# Patient Record
Sex: MALE | Race: WHITE | NOT HISPANIC OR LATINO | Employment: OTHER | URBAN - METROPOLITAN AREA
[De-identification: names, ages, dates, MRNs, and addresses within clinical notes are randomized per-mention and may not be internally consistent; named-entity substitution may affect disease eponyms.]

---

## 2024-02-11 ENCOUNTER — HOSPITAL ENCOUNTER (INPATIENT)
Facility: HOSPITAL | Age: 89
LOS: 1 days | Discharge: HOME | DRG: 093 | End: 2024-02-12
Attending: STUDENT IN AN ORGANIZED HEALTH CARE EDUCATION/TRAINING PROGRAM | Admitting: HOSPITALIST
Payer: MEDICARE

## 2024-02-11 DIAGNOSIS — R29.818 ACUTE FOCAL NEUROLOGICAL DEFICIT: ICD-10-CM

## 2024-02-11 DIAGNOSIS — I63.9 CVA (CEREBRAL VASCULAR ACCIDENT): ICD-10-CM

## 2024-02-11 PROBLEM — I48.0 PAROXYSMAL A-FIB: Status: ACTIVE | Noted: 2024-02-11

## 2024-02-11 PROBLEM — I10 HTN (HYPERTENSION): Status: ACTIVE | Noted: 2024-02-11

## 2024-02-11 PROBLEM — R47.01 APHASIA: Status: ACTIVE | Noted: 2024-02-11

## 2024-02-11 PROBLEM — N18.30 CKD (CHRONIC KIDNEY DISEASE) STAGE 3, GFR 30-59 ML/MIN: Status: ACTIVE | Noted: 2024-02-11

## 2024-02-11 LAB
ALBUMIN SERPL BCP-MCNC: 3.8 G/DL (ref 3.5–5.2)
ALLENS TEST: ABNORMAL
ALP SERPL-CCNC: 54 U/L (ref 55–135)
ALT SERPL W/O P-5'-P-CCNC: 12 U/L (ref 10–44)
ANION GAP SERPL CALC-SCNC: 10 MMOL/L (ref 8–16)
ANION GAP SERPL CALC-SCNC: 17 MMOL/L (ref 8–16)
AST SERPL-CCNC: 14 U/L (ref 10–40)
BASOPHILS # BLD AUTO: 0.04 K/UL (ref 0–0.2)
BASOPHILS NFR BLD: 0.6 % (ref 0–1.9)
BILIRUB SERPL-MCNC: 0.8 MG/DL (ref 0.1–1)
BUN SERPL-MCNC: 37 MG/DL (ref 6–30)
BUN SERPL-MCNC: 37 MG/DL (ref 8–23)
CALCIUM SERPL-MCNC: 9.5 MG/DL (ref 8.7–10.5)
CHLORIDE SERPL-SCNC: 104 MMOL/L (ref 95–110)
CHLORIDE SERPL-SCNC: 108 MMOL/L (ref 95–110)
CHOLEST SERPL-MCNC: 145 MG/DL (ref 120–199)
CHOLEST/HDLC SERPL: 3.5 {RATIO} (ref 2–5)
CO2 SERPL-SCNC: 22 MMOL/L (ref 23–29)
CREAT SERPL-MCNC: 1.6 MG/DL (ref 0.5–1.4)
CREAT SERPL-MCNC: 1.7 MG/DL (ref 0.5–1.4)
DIFFERENTIAL METHOD BLD: ABNORMAL
EOSINOPHIL # BLD AUTO: 0.1 K/UL (ref 0–0.5)
EOSINOPHIL NFR BLD: 1.4 % (ref 0–8)
ERYTHROCYTE [DISTWIDTH] IN BLOOD BY AUTOMATED COUNT: 14 % (ref 11.5–14.5)
EST. GFR  (NO RACE VARIABLE): 42 ML/MIN/1.73 M^2
GLUCOSE SERPL-MCNC: 121 MG/DL (ref 70–110)
GLUCOSE SERPL-MCNC: 121 MG/DL (ref 70–110)
HCT VFR BLD AUTO: 42.8 % (ref 40–54)
HCT VFR BLD CALC: 40 %PCV (ref 36–54)
HDLC SERPL-MCNC: 42 MG/DL (ref 40–75)
HDLC SERPL: 29 % (ref 20–50)
HGB BLD-MCNC: 13.2 G/DL (ref 14–18)
IMM GRANULOCYTES # BLD AUTO: 0.03 K/UL (ref 0–0.04)
IMM GRANULOCYTES NFR BLD AUTO: 0.4 % (ref 0–0.5)
INR PPP: 1 (ref 0.8–1.2)
LDLC SERPL CALC-MCNC: 75.6 MG/DL (ref 63–159)
LYMPHOCYTES # BLD AUTO: 1.5 K/UL (ref 1–4.8)
LYMPHOCYTES NFR BLD: 20.7 % (ref 18–48)
MCH RBC QN AUTO: 28.4 PG (ref 27–31)
MCHC RBC AUTO-ENTMCNC: 30.8 G/DL (ref 32–36)
MCV RBC AUTO: 92 FL (ref 82–98)
MONOCYTES # BLD AUTO: 0.9 K/UL (ref 0.3–1)
MONOCYTES NFR BLD: 12.6 % (ref 4–15)
NEUTROPHILS # BLD AUTO: 4.7 K/UL (ref 1.8–7.7)
NEUTROPHILS NFR BLD: 64.3 % (ref 38–73)
NONHDLC SERPL-MCNC: 103 MG/DL
NRBC BLD-RTO: 0 /100 WBC
PLATELET # BLD AUTO: 148 K/UL (ref 150–450)
PMV BLD AUTO: 10.9 FL (ref 9.2–12.9)
POC IONIZED CALCIUM: 1.26 MMOL/L (ref 1.06–1.42)
POC TCO2 (MEASURED): 25 MMOL/L (ref 23–29)
POCT GLUCOSE: 121 MG/DL (ref 70–110)
POTASSIUM BLD-SCNC: 4.5 MMOL/L (ref 3.5–5.1)
POTASSIUM SERPL-SCNC: 4.6 MMOL/L (ref 3.5–5.1)
PROT SERPL-MCNC: 7.1 G/DL (ref 6–8.4)
PROTHROMBIN TIME: 11.4 SEC (ref 9–12.5)
RBC # BLD AUTO: 4.64 M/UL (ref 4.6–6.2)
SAMPLE: ABNORMAL
SITE: ABNORMAL
SODIUM BLD-SCNC: 141 MMOL/L (ref 136–145)
SODIUM SERPL-SCNC: 140 MMOL/L (ref 136–145)
TRIGL SERPL-MCNC: 137 MG/DL (ref 30–150)
TSH SERPL DL<=0.005 MIU/L-ACNC: 2.77 UIU/ML (ref 0.4–4)
WBC # BLD AUTO: 7.24 K/UL (ref 3.9–12.7)

## 2024-02-11 PROCEDURE — 25500020 PHARM REV CODE 255: Performed by: STUDENT IN AN ORGANIZED HEALTH CARE EDUCATION/TRAINING PROGRAM

## 2024-02-11 PROCEDURE — 84443 ASSAY THYROID STIM HORMONE: CPT | Performed by: STUDENT IN AN ORGANIZED HEALTH CARE EDUCATION/TRAINING PROGRAM

## 2024-02-11 PROCEDURE — 80061 LIPID PANEL: CPT | Performed by: STUDENT IN AN ORGANIZED HEALTH CARE EDUCATION/TRAINING PROGRAM

## 2024-02-11 PROCEDURE — G0378 HOSPITAL OBSERVATION PER HR: HCPCS

## 2024-02-11 PROCEDURE — 82565 ASSAY OF CREATININE: CPT

## 2024-02-11 PROCEDURE — 93005 ELECTROCARDIOGRAM TRACING: CPT

## 2024-02-11 PROCEDURE — 84295 ASSAY OF SERUM SODIUM: CPT

## 2024-02-11 PROCEDURE — 84132 ASSAY OF SERUM POTASSIUM: CPT

## 2024-02-11 PROCEDURE — 85610 PROTHROMBIN TIME: CPT | Performed by: STUDENT IN AN ORGANIZED HEALTH CARE EDUCATION/TRAINING PROGRAM

## 2024-02-11 PROCEDURE — 93010 ELECTROCARDIOGRAM REPORT: CPT | Mod: ,,, | Performed by: INTERNAL MEDICINE

## 2024-02-11 PROCEDURE — 99900035 HC TECH TIME PER 15 MIN (STAT)

## 2024-02-11 PROCEDURE — 85025 COMPLETE CBC W/AUTO DIFF WBC: CPT | Performed by: STUDENT IN AN ORGANIZED HEALTH CARE EDUCATION/TRAINING PROGRAM

## 2024-02-11 PROCEDURE — 99285 EMERGENCY DEPT VISIT HI MDM: CPT | Mod: 25

## 2024-02-11 PROCEDURE — 83036 HEMOGLOBIN GLYCOSYLATED A1C: CPT | Performed by: PHYSICIAN ASSISTANT

## 2024-02-11 PROCEDURE — 85014 HEMATOCRIT: CPT

## 2024-02-11 PROCEDURE — 80053 COMPREHEN METABOLIC PANEL: CPT | Performed by: STUDENT IN AN ORGANIZED HEALTH CARE EDUCATION/TRAINING PROGRAM

## 2024-02-11 PROCEDURE — 82330 ASSAY OF CALCIUM: CPT

## 2024-02-11 RX ORDER — AMLODIPINE BESYLATE 5 MG/1
2.5 TABLET ORAL 2 TIMES DAILY
COMMUNITY

## 2024-02-11 RX ORDER — ATORVASTATIN CALCIUM 10 MG/1
40 TABLET, FILM COATED ORAL DAILY
Status: DISCONTINUED | OUTPATIENT
Start: 2024-02-12 | End: 2024-02-12 | Stop reason: HOSPADM

## 2024-02-11 RX ORDER — ERGOCALCIFEROL 1.25 MG/1
50000 CAPSULE ORAL
COMMUNITY
End: 2024-02-12

## 2024-02-11 RX ORDER — AMOXICILLIN 500 MG
CAPSULE ORAL DAILY
COMMUNITY

## 2024-02-11 RX ORDER — ROSUVASTATIN CALCIUM 20 MG/1
20 TABLET, COATED ORAL DAILY
COMMUNITY
End: 2024-02-12 | Stop reason: HOSPADM

## 2024-02-11 RX ORDER — LOSARTAN POTASSIUM 25 MG/1
25 TABLET ORAL DAILY
COMMUNITY

## 2024-02-11 RX ORDER — TALC
6 POWDER (GRAM) TOPICAL NIGHTLY PRN
Status: DISCONTINUED | OUTPATIENT
Start: 2024-02-11 | End: 2024-02-12 | Stop reason: HOSPADM

## 2024-02-11 RX ORDER — BISACODYL 10 MG/1
10 SUPPOSITORY RECTAL DAILY PRN
Status: DISCONTINUED | OUTPATIENT
Start: 2024-02-11 | End: 2024-02-12 | Stop reason: SDUPTHER

## 2024-02-11 RX ORDER — METOPROLOL TARTRATE 50 MG/1
50 TABLET ORAL 2 TIMES DAILY
COMMUNITY
End: 2024-02-12 | Stop reason: DRUGHIGH

## 2024-02-11 RX ORDER — ACETAMINOPHEN 325 MG/1
650 TABLET ORAL EVERY 6 HOURS PRN
Status: DISCONTINUED | OUTPATIENT
Start: 2024-02-11 | End: 2024-02-12 | Stop reason: HOSPADM

## 2024-02-11 RX ORDER — SODIUM CHLORIDE 0.9 % (FLUSH) 0.9 %
10 SYRINGE (ML) INJECTION
Status: DISCONTINUED | OUTPATIENT
Start: 2024-02-11 | End: 2024-02-12 | Stop reason: HOSPADM

## 2024-02-11 RX ORDER — AMOXICILLIN 250 MG
1 CAPSULE ORAL DAILY PRN
Status: DISCONTINUED | OUTPATIENT
Start: 2024-02-11 | End: 2024-02-12 | Stop reason: HOSPADM

## 2024-02-11 RX ADMIN — IOHEXOL 100 ML: 350 INJECTION, SOLUTION INTRAVENOUS at 04:02

## 2024-02-11 NOTE — Clinical Note
Diagnosis: Acute focal neurological deficit [[id number]]   Future Attending Provider: JASVIR GOODE [[id number]]

## 2024-02-11 NOTE — ED TRIAGE NOTES
Pt bib via pov for having trouble speaking starting 2 hours pta. Speech is not slurred or dysarthric at this time but pt is having difficulty articulating certain words. Denies headache, vision problems, weakness. Pt walks with steady gait.

## 2024-02-11 NOTE — ED PROVIDER NOTES
Encounter Date: 2/11/2024    SCRIBE #1 NOTE: I, Bruce Chavira Do, am scribing for, and in the presence of,  Jeny Seo DO. I have scribed the following portions of the note - Other sections scribed: HPI, ROS, PE.       History     Chief Complaint   Patient presents with    Aphasia     Pt to ER with reports of expressive aphasia x 2 hours. Pt denies all other symptoms. Dr campoverde in triage. Code stroke called. Pt to CT      Honorio Myers is a 88 y.o. male, with a past medical history of A-fib, CKD stage 3a, HLD, HTN, T2DM, who presents to the ED with expressive aphasia onset 2 hours ago. Additional history provided by independent historian, spouse, notes the patient did not appear baseline. She endorses he was having speech difficulty inside the kitchen. She reports the patient tried writing a message, but notes writing was incomprehensible. Patient reports knowing what to say, but endorses difficulty expressing words. Patient is 205/82 mmHg at triage. He notes compliance with Apixaban. No other exacerbating or alleviating factors. Patient denies slurred speech, facial droop, light-headedness, dizziness, chest pain, SOB, numbness, weakness, or other associated symptoms.  Denies alcohol tobacco or drug use.      The history is provided by the patient and the spouse. No  was used.     Review of patient's allergies indicates:   Allergen Reactions    Ace inhibitors      Past Medical History:   Diagnosis Date    CKD (chronic kidney disease) stage 3, GFR 30-59 ml/min     Hypertension     Paroxysmal A-fib      Past Surgical History:   Procedure Laterality Date    EYE SURGERY       History reviewed. No pertinent family history.  Social History     Tobacco Use    Smoking status: Never    Smokeless tobacco: Never   Substance Use Topics    Alcohol use: Yes    Drug use: Never     Review of Systems   Constitutional:  Negative for chills and fever.   HENT:  Negative for drooling and voice  change.    Eyes:  Negative for photophobia and visual disturbance.   Respiratory:  Negative for shortness of breath and wheezing.    Cardiovascular:  Negative for chest pain and leg swelling.   Gastrointestinal:  Negative for abdominal pain, diarrhea, nausea and vomiting.   Genitourinary:  Negative for dysuria, frequency, hematuria and urgency.   Musculoskeletal:  Negative for back pain, myalgias and neck pain.   Skin:  Negative for rash and wound.   Neurological:  Positive for speech difficulty. Negative for dizziness, syncope, facial asymmetry, weakness, light-headedness and numbness.   Psychiatric/Behavioral:  Negative for agitation, confusion and suicidal ideas.    All other systems reviewed and are negative.      Physical Exam     Initial Vitals [02/11/24 1602]   BP Pulse Resp Temp SpO2   (!) 205/82 84 20 97.7 °F (36.5 °C) 100 %      MAP       --         Physical Exam    Nursing note and vitals reviewed.  Constitutional: He appears well-developed and well-nourished. He is not diaphoretic.   HENT:   Head: Normocephalic and atraumatic.   Right Ear: External ear normal.   Left Ear: External ear normal.   Mouth/Throat: Oropharynx is clear and moist. No oropharyngeal exudate.   Eyes: Conjunctivae and EOM are normal. Pupils are equal, round, and reactive to light. Right eye exhibits no discharge. Left eye exhibits no discharge.   Neck: Neck supple. No JVD present.   Normal range of motion.  Cardiovascular:  Normal rate, regular rhythm, normal heart sounds and intact distal pulses.     Exam reveals no gallop and no friction rub.       No murmur heard.  Hypertensive.   Pulmonary/Chest: Breath sounds normal. No respiratory distress. He has no wheezes. He has no rhonchi. He has no rales.   Abdominal: Abdomen is soft. Bowel sounds are normal. He exhibits no distension. There is no abdominal tenderness. There is no rebound and no guarding.   Musculoskeletal:         General: No tenderness or edema. Normal range of motion.       Cervical back: Normal range of motion and neck supple.     Lymphadenopathy:     He has no cervical adenopathy.   Neurological: He is alert and oriented to person, place, and time. He has normal strength. He displays no atrophy, no tremor and normal reflexes. No cranial nerve deficit or sensory deficit. He exhibits normal muscle tone. He displays no seizure activity. Coordination and gait normal. GCS score is 15. GCS eye subscore is 4. GCS verbal subscore is 5. GCS motor subscore is 6.   Moves all extremities and carries on conversation. CN- II: PERRL; III/IV/VI: EOMI w/out evidence of nystagmus; V: no deficits appreciated to light touch bilateral face; VII: no facial weakness, no facial asymmetry. Eyebrow raise symmetric. Smile symmetric; IX/X: palate midline, and raises symmetrically; XI: shoulder shrug 5/5 bilaterally; XII: tongue is midline w/out asymmetry. Strength 5/5 to bilateral upper and lower extremities, sensation intact to light touch,        Skin: Skin is warm and dry.   Psychiatric: He has a normal mood and affect. Thought content normal.         ED Course   Critical Care    Date/Time: 2/11/2024 6:42 PM    Performed by: Jeny Seo DO  Authorized by: Jeny Seo DO  Direct patient critical care time: 35 minutes  Additional history critical care time: 10 minutes  Ordering / reviewing critical care time: 10 minutes  Documentation critical care time: 10 minutes  Consulting other physicians critical care time: 5 minutes  Consult with family critical care time: 10 minutes  Total critical care time (exclusive of procedural time) : 80 minutes  Critical care time was exclusive of separately billable procedures and treating other patients and teaching time.  Critical care was necessary to treat or prevent imminent or life-threatening deterioration of the following conditions: CNS failure or compromise.  Critical care was time spent personally by me on the following activities:  development of treatment plan with patient or surrogate, discussions with consultants, interpretation of cardiac output measurements, evaluation of patient's response to treatment, examination of patient, obtaining history from patient or surrogate, ordering and performing treatments and interventions, ordering and review of laboratory studies, ordering and review of radiographic studies, pulse oximetry, re-evaluation of patient's condition and review of old charts.        Labs Reviewed   CBC W/ AUTO DIFFERENTIAL - Abnormal; Notable for the following components:       Result Value    Hemoglobin 13.2 (*)     MCHC 30.8 (*)     Platelets 148 (*)     All other components within normal limits   COMPREHENSIVE METABOLIC PANEL - Abnormal; Notable for the following components:    CO2 22 (*)     Glucose 121 (*)     BUN 37 (*)     Creatinine 1.6 (*)     Alkaline Phosphatase 54 (*)     eGFR 42 (*)     All other components within normal limits   HEMOGLOBIN A1C - Abnormal; Notable for the following components:    Hemoglobin A1C 6.0 (*)     All other components within normal limits   COMPREHENSIVE METABOLIC PANEL - Abnormal; Notable for the following components:    Glucose 133 (*)     BUN 29 (*)     Albumin 3.4 (*)     Alkaline Phosphatase 48 (*)     eGFR 59 (*)     All other components within normal limits   CBC W/ AUTO DIFFERENTIAL - Abnormal; Notable for the following components:    RBC 4.27 (*)     Hemoglobin 12.0 (*)     Hematocrit 38.1 (*)     MCHC 31.5 (*)     Platelets 131 (*)     Lymph % 16.6 (*)     All other components within normal limits   TROPONIN I - Abnormal; Notable for the following components:    Troponin I 0.028 (*)     All other components within normal limits   POCT GLUCOSE - Abnormal; Notable for the following components:    POCT Glucose 121 (*)     All other components within normal limits   ISTAT PROCEDURE - Abnormal; Notable for the following components:    POC Glucose 121 (*)     POC BUN 37 (*)     POC  Creatinine 1.7 (*)     POC Anion Gap 17 (*)     All other components within normal limits   PROTIME-INR   TSH   LIPID PANEL   MAGNESIUM   PHOSPHORUS   APTT   PROTIME-INR   POCT PROTIME-INR     EKG Readings: (Independently Interpreted)   Normal sinus rhythm with a rate of 70 beats per minute, normal axis and intervals, nonspecific T-wave inversions in leads 3, AVF and V1.  No obvious acute ST segment changes.  EKG grossly unchanged when compared to previous EKG from April 10, 2010     ECG Results              EKG 12-LEAD (Final result)  Result time 02/12/24 12:07:32      Final result by Unknown User (02/12/24 12:07:32)                                      Imaging Results              MRI Brain Without Contrast (Final result)  Result time 02/12/24 11:49:07      Final result by Pancho Cruz III, MD (02/12/24 11:49:07)                   Impression:      Two punctate areas of recent microvascular small vessel ischemic change involving the left subinsular cortex and the left parietal lobe.      Electronically signed by: Pancho Cruz MD  Date:    02/12/2024  Time:    11:49               Narrative:    EXAMINATION:  MRI BRAIN WITHOUT CONTRAST    CLINICAL HISTORY:  Stroke, follow up;    FINDINGS:  There is a small diffusion positive focus involving the left subinsular cortex measuring a few mm.  There is a small diffusion positive focus involving the left parietal cortex measuring a few mm.  Remaining brain is diffusion negative.  Pituitary, midline structures, and craniocervical junction demonstrate nothing unusual.  GRE images demonstrate no significant blood products.  Flow voids are present were expected.  There are cataract implants.  There is mild left mastoid air cell opacification.  Skull and the skull base demonstrate nothing unusual.  Hippocampal formations are symmetric.  FLAIR images demonstrate involutional change but no significant chronic microvascular small vessel ischemic change.                                        CTA Head and Neck (xpd) (Edited Result - FINAL)  Result time 02/11/24 17:04:03      Addendum (preliminary) 1 of 1 by Urban Eugene MD (02/11/24 17:04:03)      No enhancing intracranial lesion is identified.      Electronically signed by: Urban Eugene  Date:    02/11/2024  Time:    17:04                 Final result by Urban Eugene MD (02/11/24 16:57:17)                   Impression:      No significant stenosis at the carotid bifurcations by NASCET criteria.    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  Calcification with moderate approaching severe stenosis at the origin of the left vertebral artery.  No significant stenosis of the right vertebral artery.    No major branch stenosis/occlusion at the [id number].    Disc bulging/endplate osteophyte formation with facet ligamentous hypertrophy flattens/impinges the traversing cord at C3-4 through C5-6.      Electronically signed by: Urban Eugene  Date:    02/11/2024  Time:    16:57               Narrative:    EXAMINATION:  CTA HEAD AND NECK (XPD)    CLINICAL HISTORY:  Neuro deficit, acute, stroke suspected;    TECHNIQUE:  CT angiogram was performed from the level of the laura to the top of the head following the IV administration of 100mL of Omnipaque 350.   Sagittal and coronal reconstructions and maximum intensity projection reconstructions were performed. Arterial stenosis percentages are based on NASCET measurement criteria.    COMPARISON:  None    FINDINGS:  There is prominent calcification with severe stenosis at the origin of the left subclavian artery extending to the proximal subclavian artery.  Calcification with moderate approaching severe stenosis is noted at the origin of the left vertebral artery without significant stenosis on the right.  The right is mildly dominant.    Mild calcification without significant stenosis by NASCET criteria at the carotid bifurcations  bilaterally.    Intracranially no major branch stenosis/occlusion at the bu[place].  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    Junctional widening of the basilar tip but no evidence of aneurysm.  The venous sinuses are patent.    No soft tissue mass is identified in the neck.  Disc bulging and endplate osteophyte formation with facet and ligamentous hypertrophy at C3-4 through C5-6 flattens/impinges the traversing cord.    These findings were communicated to Dr. Seo at 16:46 on 02/11/2024.                                        CT Head Without Contrast (Final result)  Result time 02/11/24 16:31:17      Final result by John Figueroa MD (02/11/24 16:31:17)                   Impression:      1. No acute intracranial process.  2. 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  3. 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  Limited characterization.  CTA may better characterize.  4. This report was flagged in Epic as abnormal.      Electronically signed by: John Figueroa  Date:    02/11/2024  Time:    16:31               Narrative:    EXAMINATION:  CT HEAD WITHOUT CONTRAST    CLINICAL HISTORY:  Neuro deficit, acute, stroke suspected;    TECHNIQUE:  Low dose axial CT images obtained throughout the head without intravenous contrast. Sagittal and coronal reconstructions were performed.    COMPARISON:  None.    FINDINGS:  Intracranial compartment:    Ventricles and sulci are normal in size for age without evidence of hydrocephalus. No extra-axial blood or fluid collections.    Moderate involutional changes.  Mild probable chronic microvascular ischemic changes in the periventricular white matter.  Small remote lacunar infarct of the right caudate body.    No parenchymal mass, hemorrhage, edema or major vascular distribution infarct.    Skull/extracranial contents (limited  evaluation): No fracture. Mastoid air cells and paranasal sinuses are essentially clear.    There is junctional dilation or possible aneurysm at the basilar tip.  Limited characterization.  CTA may better characterize.  No evidence of rupture.                                       Medications   iohexoL (OMNIPAQUE 350) injection 100 mL (100 mLs Intravenous Given 2/11/24 1624)     Medical Decision Making   MDM  This is an emergent evaluation of a 88 y.o. with A-fib, CKD stage 3a, HLD, HTN, T2DM, who presents to the ED with expressive aphasia onset 2 hours ago. Initial vitals in the ED [02/11/24 1602]  BP: (!) 205/82  Pulse: 84  Resp: 20  Temp: 97.7 °F (36.5 °C)  SpO2: 100 % .     Physical exam noted above. DDx includes but is not limited to ischemic versus hemorrhagic stroke, intracranial hemorrhage versus other acute intracranial abnormality, electrolyte abnormality. Also considered but clinically less likely to be dissection, PE, ACS, meningitis. Will obtain labs and imaging including stroke protocol workup including CBC, CMP, lipid panel, TSH, coags, EKG in CT head.  Stroke code was called while in triage.  Consult placed to vascular Neurology. Will continue to monitor and frequently reassess pending results of labs, treatments and final disposition.    Patient is aware of plan and is amenable.     Jeny Seo D.O  EMERGENCY MEDICINE  4:18 PM 02/11/2024    UPDATE:  Labs reveal mildly elevated creatinine at 1.6 which is increased from normal limits from labs from 2011.  BUN also 37.  Remainder of labs unremarkable.  CT head showed no acute intracranial abnormality.  Case was initially discussed with vascular Neurology, , who recommended that patient not a TNK candidate given anticoagulation use.  Recommended obtaining a stat CTA of the head and neck to rule out LVO and admission for further stroke workup if CTA unremarkable.  CTA of the head and neck reveals extensive calcification with severe  stenosis at the origin and proximal left subclavian artery as well as calcification with moderate approaches severe stenosis of the origin of the left vertebral artery.  No significant stenosis of the right vertebral artery.  I discussed these findings with vascular Neurology, who again recommended that patient not meeting indications for acute intervention and should be admitted to Hospital Medicine for further stroke workup.  Discussed case with Hospital Medicine and patient admitted to the hospital medicine observation service.  Patient and his wife aware and agreeable to plan.  This chart was completed using dictation software, as a result there may be some transcription errors.    Amount and/or Complexity of Data Reviewed  Independent Historian: spouse     Details: See HPI.  External Data Reviewed: labs, radiology, ECG and notes.  Labs: ordered. Decision-making details documented in ED Course.  Radiology: ordered and independent interpretation performed. Decision-making details documented in ED Course.  ECG/medicine tests: ordered and independent interpretation performed. Decision-making details documented in ED Course.    Risk  OTC drugs.  Prescription drug management.  Decision regarding hospitalization.      Additional MDM:     NIH Stroke Scale:   Interval = baseline (upon arrival/admit)  Level of consciousness = 0 - alert  LOC questions = 0 - answers both correctly  Best gaze = 0 - normal  Visual = 0 - no visual loss  Facial palsy = 0 - normal  Motor left arm =  0 - no drift  Motor right arm =  0 - no drift  Motor left leg = 0 - no drift  Motor right leg =  0 - no drift  Limb ataxia = 0 - absent  Sensory = 0 - normal  Dysarthria = 1 - mild to moderate dysarthria  Extinction and inattention = 0 - no neglect  NIH Stroke Scale Total = 1           Scribe Attestation:   Scribe #1: I performed the above scribed service and the documentation accurately describes the services I performed. I attest to the accuracy  of the note.              I, Jeny Seo DO, personally performed the services described in this documentation.  All medical record entries made by the scribe were at my direction and in my presence.  I have reviewed the chart and agree that the record reflects my personal performance and is accurate and complete.                   Clinical Impression:  Final diagnoses:  [R29.818] Acute focal neurological deficit  [I63.9] CVA (cerebral vascular accident)          ED Disposition Condition    Admit                 Jeny Seo DO  02/14/24 0543

## 2024-02-12 VITALS
WEIGHT: 175 LBS | HEIGHT: 69 IN | TEMPERATURE: 98 F | OXYGEN SATURATION: 97 % | DIASTOLIC BLOOD PRESSURE: 69 MMHG | SYSTOLIC BLOOD PRESSURE: 102 MMHG | HEART RATE: 79 BPM | BODY MASS INDEX: 25.92 KG/M2 | RESPIRATION RATE: 18 BRPM

## 2024-02-12 LAB
ALBUMIN SERPL BCP-MCNC: 3.4 G/DL (ref 3.5–5.2)
ALP SERPL-CCNC: 48 U/L (ref 55–135)
ALT SERPL W/O P-5'-P-CCNC: 11 U/L (ref 10–44)
ANION GAP SERPL CALC-SCNC: 8 MMOL/L (ref 8–16)
AORTIC ROOT ANNULUS: 3.22 CM
AORTIC VALVE CUSP SEPERATION: 2.51 CM
APTT PPP: 28.3 SEC (ref 21–32)
ASCENDING AORTA: 2.98 CM
AST SERPL-CCNC: 15 U/L (ref 10–40)
AV INDEX (PROSTH): 0.76
AV MEAN GRADIENT: 4 MMHG
AV PEAK GRADIENT: 8 MMHG
AV VALVE AREA BY VELOCITY RATIO: 2.85 CM²
AV VALVE AREA: 2.98 CM²
AV VELOCITY RATIO: 0.72
BASOPHILS # BLD AUTO: 0.03 K/UL (ref 0–0.2)
BASOPHILS NFR BLD: 0.4 % (ref 0–1.9)
BILIRUB SERPL-MCNC: 0.8 MG/DL (ref 0.1–1)
BSA FOR ECHO PROCEDURE: 1.97 M2
BUN SERPL-MCNC: 29 MG/DL (ref 8–23)
CALCIUM SERPL-MCNC: 9.3 MG/DL (ref 8.7–10.5)
CHLORIDE SERPL-SCNC: 107 MMOL/L (ref 95–110)
CO2 SERPL-SCNC: 23 MMOL/L (ref 23–29)
CREAT SERPL-MCNC: 1.2 MG/DL (ref 0.5–1.4)
CV ECHO LV RWT: 0.41 CM
DIFFERENTIAL METHOD BLD: ABNORMAL
DOP CALC AO PEAK VEL: 1.45 M/S
DOP CALC AO VTI: 32.4 CM
DOP CALC LVOT AREA: 3.9 CM2
DOP CALC LVOT DIAMETER: 2.24 CM
DOP CALC LVOT PEAK VEL: 1.05 M/S
DOP CALC LVOT STROKE VOLUME: 96.5 CM3
DOP CALCLVOT PEAK VEL VTI: 24.5 CM
E WAVE DECELERATION TIME: 231.69 MSEC
E/A RATIO: 0.78
E/E' RATIO: 8.38 M/S
ECHO LV POSTERIOR WALL: 0.98 CM (ref 0.6–1.1)
EOSINOPHIL # BLD AUTO: 0.1 K/UL (ref 0–0.5)
EOSINOPHIL NFR BLD: 1.2 % (ref 0–8)
ERYTHROCYTE [DISTWIDTH] IN BLOOD BY AUTOMATED COUNT: 13.8 % (ref 11.5–14.5)
EST. GFR  (NO RACE VARIABLE): 59 ML/MIN/1.73 M^2
ESTIMATED AVG GLUCOSE: 126 MG/DL (ref 68–131)
FRACTIONAL SHORTENING: 31 % (ref 28–44)
GLUCOSE SERPL-MCNC: 133 MG/DL (ref 70–110)
HBA1C MFR BLD: 6 % (ref 4–5.6)
HCT VFR BLD AUTO: 38.1 % (ref 40–54)
HGB BLD-MCNC: 12 G/DL (ref 14–18)
IMM GRANULOCYTES # BLD AUTO: 0.02 K/UL (ref 0–0.04)
IMM GRANULOCYTES NFR BLD AUTO: 0.3 % (ref 0–0.5)
INR PPP: 1.1 (ref 0.8–1.2)
INTERVENTRICULAR SEPTUM: 1.03 CM (ref 0.6–1.1)
IVRT: 85.63 MSEC
LA MAJOR: 5 CM
LA MINOR: 5.04 CM
LA WIDTH: 3.8 CM
LEFT ATRIUM SIZE: 3.69 CM
LEFT ATRIUM VOLUME INDEX: 30.7 ML/M2
LEFT ATRIUM VOLUME: 59.83 CM3
LEFT INTERNAL DIMENSION IN SYSTOLE: 3.29 CM (ref 2.1–4)
LEFT VENTRICLE DIASTOLIC VOLUME INDEX: 53.58 ML/M2
LEFT VENTRICLE DIASTOLIC VOLUME: 104.49 ML
LEFT VENTRICLE MASS INDEX: 86 G/M2
LEFT VENTRICLE SYSTOLIC VOLUME INDEX: 22.5 ML/M2
LEFT VENTRICLE SYSTOLIC VOLUME: 43.9 ML
LEFT VENTRICULAR INTERNAL DIMENSION IN DIASTOLE: 4.74 CM (ref 3.5–6)
LEFT VENTRICULAR MASS: 167.87 G
LV LATERAL E/E' RATIO: 6.7 M/S
LV SEPTAL E/E' RATIO: 11.17 M/S
LVOT MG: 2.2 MMHG
LVOT MV: 0.69 CM/S
LYMPHOCYTES # BLD AUTO: 1.1 K/UL (ref 1–4.8)
LYMPHOCYTES NFR BLD: 16.6 % (ref 18–48)
MAGNESIUM SERPL-MCNC: 2.1 MG/DL (ref 1.6–2.6)
MCH RBC QN AUTO: 28.1 PG (ref 27–31)
MCHC RBC AUTO-ENTMCNC: 31.5 G/DL (ref 32–36)
MCV RBC AUTO: 89 FL (ref 82–98)
MONOCYTES # BLD AUTO: 0.9 K/UL (ref 0.3–1)
MONOCYTES NFR BLD: 12.9 % (ref 4–15)
MV PEAK A VEL: 0.86 M/S
MV PEAK E VEL: 0.67 M/S
MV STENOSIS PRESSURE HALF TIME: 67.19 MS
MV VALVE AREA P 1/2 METHOD: 3.27 CM2
NEUTROPHILS # BLD AUTO: 4.6 K/UL (ref 1.8–7.7)
NEUTROPHILS NFR BLD: 68.6 % (ref 38–73)
NRBC BLD-RTO: 0 /100 WBC
PHOSPHATE SERPL-MCNC: 3.8 MG/DL (ref 2.7–4.5)
PISA TR MAX VEL: 2.78 M/S
PLATELET # BLD AUTO: 131 K/UL (ref 150–450)
PMV BLD AUTO: 10.3 FL (ref 9.2–12.9)
POTASSIUM SERPL-SCNC: 4.1 MMOL/L (ref 3.5–5.1)
PROT SERPL-MCNC: 6.2 G/DL (ref 6–8.4)
PROTHROMBIN TIME: 11.7 SEC (ref 9–12.5)
PULM VEIN S/D RATIO: 1.55
PV PEAK D VEL: 0.29 M/S
PV PEAK GRADIENT: 7 MMHG
PV PEAK S VEL: 0.45 M/S
PV PEAK VELOCITY: 1.32 M/S
RA MAJOR: 5.02 CM
RA PRESSURE ESTIMATED: 3 MMHG
RA WIDTH: 2.7 CM
RBC # BLD AUTO: 4.27 M/UL (ref 4.6–6.2)
RIGHT VENTRICULAR END-DIASTOLIC DIMENSION: 3.78 CM
RV TB RVSP: 6 MMHG
RV TISSUE DOPPLER FREE WALL SYSTOLIC VELOCITY 1 (APICAL 4 CHAMBER VIEW): 14.48 CM/S
SINUS: 3.03 CM
SODIUM SERPL-SCNC: 138 MMOL/L (ref 136–145)
STJ: 2.48 CM
TDI LATERAL: 0.1 M/S
TDI SEPTAL: 0.06 M/S
TDI: 0.08 M/S
TR MAX PG: 31 MMHG
TRICUSPID ANNULAR PLANE SYSTOLIC EXCURSION: 2.05 CM
TROPONIN I SERPL DL<=0.01 NG/ML-MCNC: 0.03 NG/ML (ref 0–0.03)
TV PEAK GRADIENT: 2 MMHG
TV REST PULMONARY ARTERY PRESSURE: 34 MMHG
WBC # BLD AUTO: 6.75 K/UL (ref 3.9–12.7)
Z-SCORE OF LEFT VENTRICULAR DIMENSION IN END DIASTOLE: -1.6
Z-SCORE OF LEFT VENTRICULAR DIMENSION IN END SYSTOLE: -0.31

## 2024-02-12 PROCEDURE — 99223 1ST HOSP IP/OBS HIGH 75: CPT | Mod: ,,, | Performed by: INTERNAL MEDICINE

## 2024-02-12 PROCEDURE — 92610 EVALUATE SWALLOWING FUNCTION: CPT

## 2024-02-12 PROCEDURE — 99223 1ST HOSP IP/OBS HIGH 75: CPT | Mod: 25,,, | Performed by: INTERNAL MEDICINE

## 2024-02-12 PROCEDURE — 12000002 HC ACUTE/MED SURGE SEMI-PRIVATE ROOM

## 2024-02-12 PROCEDURE — 80053 COMPREHEN METABOLIC PANEL: CPT | Performed by: PHYSICIAN ASSISTANT

## 2024-02-12 PROCEDURE — 85025 COMPLETE CBC W/AUTO DIFF WBC: CPT | Performed by: PHYSICIAN ASSISTANT

## 2024-02-12 PROCEDURE — 25000003 PHARM REV CODE 250: Performed by: PHYSICIAN ASSISTANT

## 2024-02-12 PROCEDURE — 85730 THROMBOPLASTIN TIME PARTIAL: CPT | Performed by: PHYSICIAN ASSISTANT

## 2024-02-12 PROCEDURE — 92523 SPEECH SOUND LANG COMPREHEN: CPT

## 2024-02-12 PROCEDURE — 84100 ASSAY OF PHOSPHORUS: CPT | Performed by: PHYSICIAN ASSISTANT

## 2024-02-12 PROCEDURE — 84484 ASSAY OF TROPONIN QUANT: CPT | Performed by: PHYSICIAN ASSISTANT

## 2024-02-12 PROCEDURE — 83735 ASSAY OF MAGNESIUM: CPT | Performed by: PHYSICIAN ASSISTANT

## 2024-02-12 PROCEDURE — 85610 PROTHROMBIN TIME: CPT | Performed by: PHYSICIAN ASSISTANT

## 2024-02-12 RX ORDER — ATORVASTATIN CALCIUM 40 MG/1
40 TABLET, FILM COATED ORAL DAILY
Qty: 90 TABLET | Refills: 3 | Status: SHIPPED | OUTPATIENT
Start: 2024-02-13 | End: 2025-02-12

## 2024-02-12 RX ORDER — NAPROXEN SODIUM 220 MG/1
81 TABLET, FILM COATED ORAL DAILY
Refills: 0 | COMMUNITY
Start: 2024-02-12 | End: 2025-02-11

## 2024-02-12 RX ORDER — METOPROLOL SUCCINATE 50 MG/1
0.5 TABLET, EXTENDED RELEASE ORAL 2 TIMES DAILY
COMMUNITY
Start: 2023-12-19

## 2024-02-12 RX ORDER — FINASTERIDE 5 MG/1
5 TABLET, FILM COATED ORAL DAILY
COMMUNITY

## 2024-02-12 RX ORDER — BISACODYL 10 MG/1
10 SUPPOSITORY RECTAL DAILY PRN
Status: DISCONTINUED | OUTPATIENT
Start: 2024-02-13 | End: 2024-02-12 | Stop reason: HOSPADM

## 2024-02-12 RX ADMIN — Medication 6 MG: at 01:02

## 2024-02-12 RX ADMIN — APIXABAN 2.5 MG: 2.5 TABLET, FILM COATED ORAL at 09:02

## 2024-02-12 RX ADMIN — ATORVASTATIN CALCIUM 40 MG: 10 TABLET, FILM COATED ORAL at 09:02

## 2024-02-12 NOTE — ASSESSMENT & PLAN NOTE
Presented with expressive aphasia that began 2 hours prior to arrival. Discussed with tele-neurology not a candidate for TPA given eliquis use. CT head without acute intracranial abnormality. CTA head and neck without significant stenosis at carotid bifurcations, no major/branch stenosis/occlusion at [place]. Stenosis at the proximal left subclavian extending toward left vertebral artery, vascular neurology ok with HM at   Continue eliquis/statin  MRI/echo  Neurology consulted  Vascular surgery consulted given subclavian stenosis/approaching vertebral artery  PT/OT/SLP  Neuro checks/fall precautions/aspiration precautions/telemetry  Permissive HT

## 2024-02-12 NOTE — CONSULTS
[facility] [place] Emergency Dept  Cardiology  Consult Note    Patient Name: Honorio Myers  MRN: [id number]  Admission Date: 2/11/2024  Hospital Length of Stay: 0 days  Code Status: Full Code   Attending Provider: patient and ER records  Consulting Provider: Jo-Ann Littlejohn MD  Primary Care Physician: Daryl Nolan MD  Principal Problem:Aphasia    Patient information was obtained from patient and ER records.     Inpatient consult to Cardiology  Consult performed by: Jo-Ann Littlejohn MD  Consult ordered by: Jc Schmitz PA-C        Subjective:     Chief Complaint:  AFIB     HPI:   PATIENT IS A PLEASANT 88-YEAR-OLD MAN.  VERY FUNCTIONAL.  FOLLOWS WITH DR. JESSICA GUADARRAMA AT [facility].  AFIB ON ELIQUIS 2.5 MG B.I.D..  CAME IN WITH APHASIA.  NEUROLOGY SAW HIM AND RECOMMENDED STARTING ASPIRIN 81 MG DAILY AND ELIQUIS DOSE TO BE INCREASED TO 5 MG B.I.D..  HAD ECHOCARDIOGRAM DONE TODAY WHICH SHOWED NORMAL LEFT VENTRICLE SYSTOLIC FUNCTION.  DENIES ORTHOPNEA, PND, SWELLING OF FEET.  NEUROLOGICAL DYSFUNCTION HAS RESOLVED.    Results for orders placed during the hospital encounter of 02/11/24    Echo Saline Bubble? Yes    Interpretation Summary    Left Ventricle: The left ventricle is normal in size. Normal wall thickness. Normal wall motion. There is normal systolic function with a visually estimated ejection fraction of 55 - 60%. Grade I diastolic dysfunction.    Right Ventricle: Normal right ventricular cavity size. Systolic function is normal.    Left Atrium: Left atrium is mildly dilated.    Tricuspid Valve: There is mild regurgitation.    Pulmonic Valve: There is mild regurgitation.    Pulmonary Artery: The estimated pulmonary artery systolic pressure is 34 mmHg.    IVC/SVC: Normal venous pressure at 3 mmHg.          HPI: Honorio Myers 88 y.o. male with afib on eliquis, CKD, HTN, presents to the hospital with a chief complaint of aphasia.  Reports sudden onset of aphasia described as the  ability to think his words but unable to get them out that began 2 hours prior to arrival.  The symptoms resolved without intervention.  He denies any attempted treatments at home.  There was no numbness weakness in extremity slurred speech difficulty swallowing dizziness.  He reports compliance with his home Eliquis and took it this morning.  He denies fever chest pain nausea vomiting abdominal pain leg swelling melena hematuria hematemesis dizziness and syncope     In the ED, discussed with vascular neurology not a TNK candidate given eliquis use, Cr 1.6, CT head without acute abnormality, CTA head and neck without significant stenosis at the carotid bifurcations, extensive calcifications with severe stenosis at the proximal left subclaviana and moderate approaching severe stenosis at the left vertebral artery.        Past Medical History:   Diagnosis Date    CKD (chronic kidney disease) stage 3, GFR 30-59 ml/min     Hypertension     Paroxysmal A-fib        Past Surgical History:   Procedure Laterality Date    EYE SURGERY         Review of patient's allergies indicates:   Allergen Reactions    Ace inhibitors        No current facility-administered medications on file prior to encounter.     Current Outpatient Medications on File Prior to Encounter   Medication Sig    amLODIPine (NORVASC) 5 MG tablet Take 2.5 mg by mouth 2 (two) times daily.    finasteride (PROSCAR) 5 mg tablet Take 5 mg by mouth once daily.    losartan (COZAAR) 25 MG tablet Take 25 mg by mouth once daily.    metoprolol succinate (TOPROL-XL) 50 MG 24 hr tablet Take 0.5 tablets by mouth 2 (two) times daily.    omega-3 fatty acids/fish oil (FISH OIL-OMEGA-3 FATTY ACIDS) 300-1,000 mg capsule Take by mouth once daily.    [DISCONTINUED] apixaban (ELIQUIS) 2.5 mg Tab Take by mouth 2 (two) times daily.    [DISCONTINUED] metoprolol tartrate (LOPRESSOR) 50 MG tablet Take 50 mg by mouth 2 (two) times daily.    [DISCONTINUED] rosuvastatin (CRESTOR) 20 MG  tablet Take 20 mg by mouth once daily.    CHROMIUM PICOLINATE ORAL Take 200 mcg by mouth once daily.    [DISCONTINUED] ergocalciferol (VITAMIN D2) 50,000 unit Cap Take 50,000 Units by mouth every 7 days.     Family History    None       Tobacco Use    Smoking status: Never    Smokeless tobacco: Never   Substance and Sexual Activity    Alcohol use: Yes    Drug use: Never    Sexual activity: Not on file     Review of Systems   Constitutional: Negative.   HENT: Negative.     Eyes: Negative.    Cardiovascular: Negative.    Respiratory: Negative.     Endocrine: Negative.    Hematologic/Lymphatic: Negative.    Skin: Negative.    Musculoskeletal: Negative.    Gastrointestinal: Negative.    Genitourinary: Negative.    Psychiatric/Behavioral: Negative.     Allergic/Immunologic: Negative.      Objective:     Vital Signs (Most Recent):  Temp: 97.9 °F (36.6 °C) (02/12/24 1734)  Pulse: 79 (02/12/24 1730)  Resp: 18 (02/12/24 1730)  BP: 102/69 (02/12/24 1730)  SpO2: 97 % (02/12/24 1730) Vital Signs (24h Range):  Temp:  [97.9 °F (36.6 °C)-98.2 °F (36.8 °C)] 97.9 °F (36.6 °C)  Pulse:  [61-87] 79  Resp:  [16-27] 18  SpO2:  [92 %-98 %] 97 %  BP: ()/(53-69) 102/69     Weight: 79.4 kg (175 lb)  Body mass index is 25.84 kg/m².    SpO2: 97 %         Intake/Output Summary (Last 24 hours) at 2/12/2024 1739  Last data filed at 2/11/2024 1954  Gross per 24 hour   Intake --   Output 350 ml   Net -350 ml       Lines/Drains/Airways       None                    Physical Exam  Vitals reviewed.   Constitutional:       Appearance: He is well-developed.   HENT:      Head: Normocephalic.   Eyes:      Conjunctiva/sclera: Conjunctivae normal.      Pupils: Pupils are equal, round, and reactive to light.   Cardiovascular:      Rate and Rhythm: Normal rate and regular rhythm.      Heart sounds: Normal heart sounds.   Pulmonary:      Effort: Pulmonary effort is normal.      Breath sounds: Normal breath sounds.   Abdominal:      General: Bowel sounds  "are normal.      Palpations: Abdomen is soft.   Musculoskeletal:      Cervical back: Normal range of motion and neck supple.   Skin:     General: Skin is warm.   Neurological:      Mental Status: He is alert and oriented to person, place, and time.          Significant Labs:     DATA:     Laboratory:  CBC:  Recent Labs   Lab 02/11/24  1622 02/11/24  1633 02/12/24  0425   WBC 7.24  --  6.75   Hemoglobin 13.2 L  --  12.0 L   POC Hematocrit  --  40  --    Hematocrit 42.8  --  38.1 L   Platelets 148 L  --  131 L       CHEMISTRIES:  Recent Labs   Lab 11/21/23  0638 02/11/24  1622 02/12/24  0425   Glucose  --  121 H 133 H   Sodium  --  140 138   Potassium  --  4.6 4.1   BUN  --  37 H 29 H   Creatinine  --  1.6 H 1.2   Calcium 9.6 9.5 9.3   Magnesium  --   --  2.1       CARDIAC BIOMARKERS:  Recent Labs   Lab 02/12/24  0425   Troponin I 0.028 H       COAGS:  Recent Labs   Lab 02/11/24  1622 02/12/24  0425   INR 1.0 1.1       LIPIDS/LFTS:  Recent Labs   Lab 02/11/24  1622 02/12/24  0425   Cholesterol 145  --    Triglycerides 137  --    HDL 42  --    LDL Cholesterol 75.6  --    Non-HDL Cholesterol 103  --    AST 14 15   ALT 12 11       Hemoglobin A1C   Date Value Ref Range Status   02/11/2024 6.0 (H) 4.0 - 5.6 % Final     Comment:     ADA Screening Guidelines:  5.7-6.4%  Consistent with prediabetes  >or=6.5%  Consistent with diabetes    High levels of fetal hemoglobin interfere with the HbA1C  assay. Heterozygous hemoglobin variants (HbS, HgC, etc)do  not significantly interfere with this assay.   However, presence of multiple variants may affect accuracy.         TSH  Recent Labs   Lab 02/11/24  1622   TSH 2.774       The ASCVD Risk score (Riya DK, et al., 2019) failed to calculate for the following reasons:    The 2019 ASCVD risk score is only valid for ages 40 to 79    The patient has a prior MI or stroke diagnosis       BNP    No results found for: "BNP"         ECHO    Results for orders placed during the hospital " encounter of 02/11/24    Echo Saline Bubble? Yes    Interpretation Summary    Left Ventricle: The left ventricle is normal in size. Normal wall thickness. Normal wall motion. There is normal systolic function with a visually estimated ejection fraction of 55 - 60%. Grade I diastolic dysfunction.    Right Ventricle: Normal right ventricular cavity size. Systolic function is normal.    Left Atrium: Left atrium is mildly dilated.    Tricuspid Valve: There is mild regurgitation.    Pulmonic Valve: There is mild regurgitation.    Pulmonary Artery: The estimated pulmonary artery systolic pressure is 34 mmHg.    IVC/SVC: Normal venous pressure at 3 mmHg.    BUBBLE STUDY NEGATIVE FOR RIGHT-TO-LEFT SHUNT      STRESS TEST    No results found for this or any previous visit.        CATH    No results found for this or any previous visit.      Assessment and Plan:     * Aphasia  MANAGEMENT PER NEUROLOGY    HTN (hypertension)      Temp:  [97.9 °F (36.6 °C)-98.2 °F (36.8 °C)]   Pulse:  [61-87]   Resp:  [16-27]   BP: ()/(53-69)   SpO2:  [92 %-98 %] .   Home meds for hypertension were reviewed and noted below.   Hypertension Medications               amLODIPine (NORVASC) 5 MG tablet Take 2.5 mg by mouth 2 (two) times daily.    losartan (COZAAR) 25 MG tablet Take 25 mg by mouth once daily.    metoprolol succinate (TOPROL-XL) 50 MG 24 hr tablet Take 0.5 tablets by mouth 2 (two) times daily.              CKD (chronic kidney disease) stage 3, GFR 30-59 ml/min  Creatine stable for now. BMP reviewed- noted Estimated Creatinine Clearance: 44.2 mL/min (based on SCr of 1.2 mg/dL). .  Monitor UOP and serial BMP and adjust therapy as needed. Renally dose meds. Avoid nephrotoxic medications and procedures.    Paroxysmal A-fib  CURRENTLY IN SINUS RHYTHM.  AGREE WITH NEUROLOGY RECOMMENDATIONS OF ADDING ASPIRIN 81 MG DAILY AND INCREASING ELIQUIS TO 5 MG B.I.D..  MAKE THOSE RECOMMENDATIONS TO THE PATIENT.  PATIENT STATES THAT HE WOULD LIKE TO  DISCUSS THIS WITH HIS PRIMARY CARDIOLOGIST DR. HOPKINS BEFORE MAKING ANY CHANGES.  CURRENTLY IN SINUS RHYTHM.  BUBBLE STUDY NEGATIVE ON ECHOCARDIOGRAM     FOLLOW-UP IN CARDIOLOGY CLINIC        VTE Risk Mitigation (From admission, onward)           Ordered     apixaban tablet 5 mg  2 times daily         02/12/24 1337     IP VTE HIGH RISK PATIENT  Once         02/11/24 1827     Place sequential compression device  Until discontinued         02/11/24 1827     Place VAHE hose  Until discontinued         02/11/24 1827                    Thank you for your consult. I will follow-up with patient. Please contact us if you have any additional questions.    Jo-Ann Littlejohn MD  Cardiology   [facility] [place] - Emergency Dept

## 2024-02-12 NOTE — ASSESSMENT & PLAN NOTE
Home metoprolol/losartan held for permissive HTN pending MRI      Chronic, controlled. Latest blood pressure and vitals reviewed-     Temp:  [97.7 °F (36.5 °C)-98.2 °F (36.8 °C)]   Pulse:  [61-87]   Resp:  [16-27]   BP: ()/(53-82)   SpO2:  [92 %-100 %] .   Home meds for hypertension were reviewed and noted below.   Hypertension Medications               amLODIPine (NORVASC) 5 MG tablet Take 2.5 mg by mouth 2 (two) times daily.    losartan (COZAAR) 25 MG tablet Take 25 mg by mouth once daily.    metoprolol tartrate (LOPRESSOR) 50 MG tablet Take 50 mg by mouth 2 (two) times daily.            While in the hospital, will manage blood pressure as follows; Adjust home antihypertensive regimen as follows- home medications held for permissive HTN    Will utilize p.r.n. blood pressure medication only if patient's blood pressure greater than 180/110 and he develops symptoms such as worsening chest pain or shortness of breath.

## 2024-02-12 NOTE — HOSPITAL COURSE
Honorio Myers was placed under observation for management of aphasia.    Throughout his observation stay, the patient and his wife have endorsed that his speech has returned to baseline.  They stated that prior to a returned to baseline, he was noted to have word-finding difficulty, which has not returned.  Neurology was consulted and recommended switching to Eliquis 5 mg BID for future stroke prevention, and High Intensity statin of Atorvastatin 40 mg. SLP evaluated the patient and recommended audiology follow up but no further speech evaluation. Cardiology was consulted for further anticoagulation recommends, per Neurology recommendations. MRI brain was obtained which noted two punctate areas of recent microvascular small vessel ischemic change of the left subinsular cortex and left parietal lobe. Echocardiogram was ordered which noted normal LV size with normal wall motion and normal systolic function with EF of 55-60% and grade 1 DD, with normal RV size and systolic function. 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 Due to further calcification, vascular surgery was consulted. Mr. Myers has decided to leave AMA prior to evaluation by cardiology and vascular surgery. Mr. Myers is of sound decision making capacity and has decided to sign out AMA in the presence of the nursing staff and his wife. Vitals prior to discharge are as follows: HR: 69-83 bpm, RR: 19 and BP: 132/63.    Mr. Myers has signed out AMA prior to Vascular Surgery and Cardiology evaluation.

## 2024-02-12 NOTE — ED NOTES
Pt w/ a history of Afib, CKD, HTN and NIDDM presented today w/ c/o expressive aphasia. Stroke activation was called.  Symptoms later completely resolved on their own.  Pt admitted to Obs for diagnosis acute focal neurological deficits. Presently pt is AAOx3, resp even and unlabored, skin warm and dry. Pulses +2 BUE and BLE. Abd soft, flat and non-tender.  Pt denies any complaints, speech clear, no neuro deficits.  Pt denies HA, blurry vision, dizziness, cp or sob.  HOB 30 degrees, SR up x 2, call bell within reach.  Wife at bedside.  Awaiting admit bed and MRI.  NAD noted

## 2024-02-12 NOTE — ASSESSMENT & PLAN NOTE
Presented with expressive aphasia that began 2 hours prior to arrival. Discussed with tele-neurology not a candidate for TPA given eliquis use. CT head without acute intracranial abnormality. CTA head and neck without significant stenosis at carotid bifurcations, no major/branch stenosis/occlusion at [place] of [place]ard. 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  Neurology consulted; awaiting evaluation  Vascular surgery consulted given subclavian stenosis/approaching vertebral artery; awaiting evaluation.  SLP consulted for aphasia.  Neuro checks/fall precautions/aspiration precautions/telemetry  Permissive HT, until MRI brain results.    02/12: Good strengths noted on exam. Patient and wife endorse 1 episode of word-finding difficulty following admission, around 8 pm last night, which has since subsided.

## 2024-02-12 NOTE — PROGRESS NOTES
[facility]  [facility] Medicine  Progress Note    Patient Name: Honorio Myers  MRN: [id number]  Patient Class: OP- Observation   Admission Date: 2/11/2024  Length of Stay: 0 days  Attending Physician: Rachel Werner MD  Primary Care Provider: Unable, To Obtain        Subjective:     Principal Problem:Aphasia        HPI:  Honorio Myers 88 y.o. male with afib on eliquis, CKD, HTN, presents to the hospital with a chief complaint of aphasia.  Reports sudden onset of aphasia described as the ability to think his words but unable to get them out that began 2 hours prior to arrival.  The symptoms resolved without intervention.  He denies any attempted treatments at home.  There was no numbness weakness in extremity slurred speech difficulty swallowing dizziness.  He reports compliance with his home Eliquis and took it this morning.  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     Overview/Hospital Course:  Honorio Myers was placed under observation for management of aphasia.    Throughout his observation stay, the patient and his wife have endorsed that his speech has returned to baseline.  They stated that prior to a returned to baseline, he was noted to have word-finding difficulty, which has not returned.  Neurology, vascular surgery and speech pathology has been consulted.  MRI brain and echocardiogram are pending.    Interval History: KAI; Patient and his wife at bedside endorse that his speech has returned to baseline, since admission, but around 8pm yesterday,  he did have some word-finding difficulty which later subsided.    Review of Systems   Constitutional:  Positive for activity change. Negative for chills and fatigue.   HENT:  Negative for congestion, rhinorrhea, sneezing, sore throat and trouble swallowing.    Eyes:  Negative for pain, redness and visual disturbance.   Respiratory:  Negative for apnea, chest tightness, shortness of breath and wheezing.    Cardiovascular:  Negative for chest pain, palpitations and leg swelling.   Gastrointestinal:  Negative for abdominal distention, abdominal pain, constipation, diarrhea and vomiting.   Genitourinary:  Negative for difficulty urinating, dysuria, hematuria and urgency.   Musculoskeletal:  Negative for arthralgias, back pain and gait problem.   Skin:  Negative for color change and pallor.   Neurological:  Positive for speech difficulty. Negative for dizziness, facial asymmetry, weakness, light-headedness and numbness.   Psychiatric/Behavioral:  Negative for agitation and behavioral problems.      Objective:     Vital Signs (Most Recent):  Temp: 98.2 °F (36.8 °C) (02/11/24 2110)  Pulse: 62 (02/12/24 0701)  Resp: 16 (02/12/24 0603)  BP: 119/62 (02/12/24 0701)  SpO2: 96 % (02/12/24 0701) Vital Signs (24h Range):  Temp:  [97.7 °F (36.5 °C)-98.2 °F (36.8 °C)] 98.2 °F (36.8 °C)  Pulse:  [61-87] 62  Resp:  [16-27] 16  SpO2:  [92 %-100 %] 96 %  BP: ()/(53-82) 119/62     Weight: 79.4 kg (175 lb)  Body mass index is 25.84 kg/m².    Intake/Output Summary (Last 24 hours) at 2/12/2024 0929  Last data filed at 2/11/2024 1954  Gross per 24 hour   Intake --   Output 350 ml   Net -350 ml         Physical Exam  Vitals reviewed.   Constitutional:       General: He is not in acute distress.     Appearance: Normal appearance. He is normal weight. He is not ill-appearing.   HENT:      Head: Normocephalic and atraumatic.      Right Ear: External ear normal.      Left Ear: External ear normal.      Nose: Nose normal.      Mouth/Throat:       Pharynx: Oropharynx is clear.   Eyes:      General:         Right eye: No discharge.         Left eye: No discharge.      Extraocular Movements: Extraocular movements intact.      Conjunctiva/sclera: Conjunctivae normal.   Cardiovascular:      Rate and Rhythm: Normal rate and regular rhythm.      Pulses: Normal pulses.      Heart sounds: Normal heart sounds. No murmur heard.     Comments: Admission EKG reviewed and noted to show NSR at 70 bpm.  Pulmonary:      Effort: Pulmonary effort is normal. No respiratory distress.      Breath sounds: Normal breath sounds. No wheezing.   Abdominal:      General: Bowel sounds are normal. There is no distension.      Palpations: Abdomen is soft.      Tenderness: There is no abdominal tenderness.   Musculoskeletal:         General: No swelling or tenderness. Normal range of motion.      Cervical back: Normal range of motion.      Right lower leg: No edema.      Left lower leg: No edema.   Skin:     General: Skin is warm.      Capillary Refill: Capillary refill takes less than 2 seconds.   Neurological:      General: No focal deficit present.      Mental Status: He is alert and oriented to person, place, and time. Mental status is at baseline.      Motor: No weakness, tremor or pronator drift.      Comments: 5/5 strengths noted on upper and lower bilateral extremities             Significant Labs: All pertinent labs within the past 24 hours have been reviewed.  CBC:   Recent Labs   Lab 02/11/24  1622 02/11/24  1633 02/12/24  0425   WBC 7.24  --  6.75   HGB 13.2*  --  12.0*   HCT 42.8 40 38.1*   *  --  131*     CMP:   Recent Labs   Lab 02/11/24  1622 02/12/24  0425    138   K 4.6 4.1    107   CO2 22* 23   * 133*   BUN 37* 29*   CREATININE 1.6* 1.2   CALCIUM 9.5 9.3   PROT 7.1 6.2   ALBUMIN 3.8 3.4*   BILITOT 0.8 0.8   ALKPHOS 54* 48*   AST 14 15   ALT 12 11   ANIONGAP 10 8       Significant Imaging: I have reviewed all pertinent imaging  results/findings within the past 24 hours.    Assessment/Plan:      * Aphasia  Presented with expressive aphasia that began 2 hours prior to arrival. Discussed with tele-neurology not a candidate for TPA given eliquis use. CT head without acute intracranial abnormality. CTA head and neck without significant stenosis at carotid bifurcations, no major/branch stenosis/occlusion at [place] of girard. 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  Neurology consulted; awaiting evaluation  Vascular surgery consulted given subclavian stenosis/approaching vertebral artery; awaiting evaluation.  SLP consulted for aphasia.  Neuro checks/fall precautions/aspiration precautions/telemetry  Permissive HT, until MRI brain results.    02/12: Good strengths noted on exam. Patient and wife endorse 1 episode of word-finding difficulty following admission, around 8 pm last night, which has since subsided.    HTN (hypertension)  Home metoprolol/losartan held for permissive HTN pending MRI      Chronic, controlled. Latest blood pressure and vitals reviewed-     Temp:  [97.7 °F (36.5 °C)-98.2 °F (36.8 °C)]   Pulse:  [61-87]   Resp:  [16-27]   BP: ()/(53-82)   SpO2:  [92 %-100 %] .   Home meds for hypertension were reviewed and noted below.   Hypertension Medications               amLODIPine (NORVASC) 5 MG tablet Take 2.5 mg by mouth 2 (two) times daily.    losartan (COZAAR) 25 MG tablet Take 25 mg by mouth once daily.    metoprolol tartrate (LOPRESSOR) 50 MG tablet Take 50 mg by mouth 2 (two) times daily.            While in the hospital, will manage blood pressure as follows; Adjust home antihypertensive regimen as follows- home medications held for permissive HTN    Will utilize p.r.n. blood pressure medication only if patient's blood pressure greater than 180/110 and he develops symptoms such as  worsening chest pain or shortness of breath.    CKD (chronic kidney disease) stage 3, GFR 30-59 ml/min  Cr today of 1.6. baseline of 1.3 to 1.6.       Creatine stable for now. BMP reviewed- noted Estimated Creatinine Clearance: 33.1 mL/min (A) (based on SCr of 1.6 mg/dL (H)). according to latest data. Based on current GFR, CKD stage is stage 3 - GFR 30-59.  Monitor UOP and serial BMP and adjust therapy as needed. Renally dose meds. Avoid nephrotoxic medications and procedures.    Paroxysmal A-fib  Patient with Paroxysmal (<7 days) atrial fibrillation which is controlled currently with Beta Blocker. Anticoagulation indicated. Anticoagulation done with continue home eliquis .      VTE Risk Mitigation (From admission, onward)           Ordered     apixaban tablet 2.5 mg  2 times daily         02/11/24 1940     IP VTE HIGH RISK PATIENT  Once         02/11/24 1827     Place sequential compression device  Until discontinued         02/11/24 1827     Place VAHE hose  Until discontinued         02/11/24 1827                    Discharge Planning   REJI:      Code Status: Full Code   Is the patient medically ready for discharge?:     Reason for patient still in hospital (select all that apply): Patient trending condition, Treatment, and Consult recommendations           Jc Schmitz PA-C  Department of Hospital Medicine  [facility]  02/12/2024

## 2024-02-12 NOTE — PLAN OF CARE
[place] [facility] - Emergency Dept  Initial Discharge Assessment       Primary Care Provider: Daryl Nolan MD  Case Management Assessment     PCP: See above  Pharmacy: [facility]    Patient Arrived From: Home with spouse  Existing Help at Home: spouse    Barriers to Discharge: none    Discharge Plan:    A. Home   B. Home Health        Admission Diagnosis: Acute focal neurological deficit [R29.818]    Admission Date: 2/11/2024  Expected Discharge Date:     Transition of Care Barriers: None    Payor: MEDICARE / Plan: MEDICARE PART A & B / Product Type: Government /     Extended Emergency Contact Information  Primary Emergency Contact: allie king  Mobile Phone: [phone]  Relation: Spouse  Preferred language: English   needed? No    Discharge Plan A: Home  Discharge Plan B: Home Health      [facility] [address]  [place] [address]  [address]  [place] [address]  Phone: [phone] Fax: [phone]      Initial Assessment (most recent)       Adult Discharge Assessment - 02/12/24 1401          Discharge Assessment    Assessment Type Discharge Planning Assessment     Confirmed/corrected address, phone number and insurance Yes     Confirmed Demographics Correct on Facesheet     Source of Information patient;family     When was your last doctors appointment? --   1 month ago    Communicated REJI with patient/caregiver Yes   Possibly today if all test are done (cards, neurology, vascular)    Reason For Admission Stroke     People in Home spouse     Facility Arrived From: Home     Do you expect to return to your current living situation? Yes     Do you have help at home or someone to help you manage your care at home? Yes     Who are your caregiver(s) and their phone number(s)? wife:  Allie Louis:  [phone]     Prior to hospitilization cognitive status: Alert/Oriented     Current cognitive status: Alert/Oriented     Walking or Climbing Stairs  Difficulty no     Dressing/Bathing Difficulty no     Equipment Currently Used at Home none     Readmission within 30 days? No     Patient currently being followed by outpatient case management? No     Do you currently have service(s) that help you manage your care at home? No     Do you take prescription medications? Yes     Do you have prescription coverage? Yes     Coverage BCBS Medicare     Do you have any problems affording any of your prescribed medications? No     Who is going to help you get home at discharge? wife     How do you get to doctors appointments? car, drives self     Are you on dialysis? No     Do you take coumadin? No   Eliquis    Discharge Plan A Home     Discharge Plan B Home Health     DME Needed Upon Discharge  none     Discharge Plan discussed with: Spouse/sig other;Patient     Transition of Care Barriers None

## 2024-02-12 NOTE — ED NOTES
Pt took own Apixaban 2.5mg PO at 2100.  Pt instructed not to take any medications on his own, RN will administer.  Pt and wife verbalized understanding.

## 2024-02-12 NOTE — ASSESSMENT & PLAN NOTE
Creatine stable for now. BMP reviewed- noted Estimated Creatinine Clearance: 44.2 mL/min (based on SCr of 1.2 mg/dL). .  Monitor UOP and serial BMP and adjust therapy as needed. Renally dose meds. Avoid nephrotoxic medications and procedures.

## 2024-02-12 NOTE — PLAN OF CARE
Cognitive linguistic eval completed; functional language/artic/cognition at reported baseline. Receptive language negatively impacted by premorbid hearing loss which has not been formally evaluated or treated. ST RECS: audiology referral, no further ST I is warranted.  Linsey Jackson, YUVAL-SLP

## 2024-02-12 NOTE — CONSULTS
Pt not appropriate for education at this time. Will follow up with pt when he is moved to the floor. Attached heart healthy diet education to d/ documents.

## 2024-02-12 NOTE — SUBJECTIVE & OBJECTIVE
Past Medical History:   Diagnosis Date    CKD (chronic kidney disease) stage 3, GFR 30-59 ml/min     Hypertension     Paroxysmal A-fib        Past Surgical History:   Procedure Laterality Date    EYE SURGERY         Review of patient's allergies indicates:   Allergen Reactions    Ace inhibitors        No current facility-administered medications on file prior to encounter.     Current Outpatient Medications on File Prior to Encounter   Medication Sig    amLODIPine (NORVASC) 5 MG tablet Take 2.5 mg by mouth 2 (two) times daily.    finasteride (PROSCAR) 5 mg tablet Take 5 mg by mouth once daily.    losartan (COZAAR) 25 MG tablet Take 25 mg by mouth once daily.    metoprolol succinate (TOPROL-XL) 50 MG 24 hr tablet Take 0.5 tablets by mouth 2 (two) times daily.    omega-3 fatty acids/fish oil (FISH OIL-OMEGA-3 FATTY ACIDS) 300-1,000 mg capsule Take by mouth once daily.    [DISCONTINUED] apixaban (ELIQUIS) 2.5 mg Tab Take by mouth 2 (two) times daily.    [DISCONTINUED] metoprolol tartrate (LOPRESSOR) 50 MG tablet Take 50 mg by mouth 2 (two) times daily.    [DISCONTINUED] rosuvastatin (CRESTOR) 20 MG tablet Take 20 mg by mouth once daily.    CHROMIUM PICOLINATE ORAL Take 200 mcg by mouth once daily.    [DISCONTINUED] ergocalciferol (VITAMIN D2) 50,000 unit Cap Take 50,000 Units by mouth every 7 days.     Family History    None       Tobacco Use    Smoking status: Never    Smokeless tobacco: Never   Substance and Sexual Activity    Alcohol use: Yes    Drug use: Never    Sexual activity: Not on file     Review of Systems   Constitutional: Negative.   HENT: Negative.     Eyes: Negative.    Cardiovascular: Negative.    Respiratory: Negative.     Endocrine: Negative.    Hematologic/Lymphatic: Negative.    Skin: Negative.    Musculoskeletal: Negative.    Gastrointestinal: Negative.    Genitourinary: Negative.    Psychiatric/Behavioral: Negative.     Allergic/Immunologic: Negative.      Objective:     Vital Signs (Most  Recent):  Temp: 97.9 °F (36.6 °C) (02/12/24 1734)  Pulse: 79 (02/12/24 1730)  Resp: 18 (02/12/24 1730)  BP: 102/69 (02/12/24 1730)  SpO2: 97 % (02/12/24 1730) Vital Signs (24h Range):  Temp:  [97.9 °F (36.6 °C)-98.2 °F (36.8 °C)] 97.9 °F (36.6 °C)  Pulse:  [61-87] 79  Resp:  [16-27] 18  SpO2:  [92 %-98 %] 97 %  BP: ()/(53-69) 102/69     Weight: 79.4 kg (175 lb)  Body mass index is 25.84 kg/m².    SpO2: 97 %         Intake/Output Summary (Last 24 hours) at 2/12/2024 1739  Last data filed at 2/11/2024 1954  Gross per 24 hour   Intake --   Output 350 ml   Net -350 ml       Lines/Drains/Airways       None                    Physical Exam  Vitals reviewed.   Constitutional:       Appearance: He is well-developed.   HENT:      Head: Normocephalic.   Eyes:      Conjunctiva/sclera: Conjunctivae normal.      Pupils: Pupils are equal, round, and reactive to light.   Cardiovascular:      Rate and Rhythm: Normal rate and regular rhythm.      Heart sounds: Normal heart sounds.   Pulmonary:      Effort: Pulmonary effort is normal.      Breath sounds: Normal breath sounds.   Abdominal:      General: Bowel sounds are normal.      Palpations: Abdomen is soft.   Musculoskeletal:      Cervical back: Normal range of motion and neck supple.   Skin:     General: Skin is warm.   Neurological:      Mental Status: He is alert and oriented to person, place, and time.          Significant Labs:     DATA:     Laboratory:  CBC:  Recent Labs   Lab 02/11/24  1622 02/11/24  1633 02/12/24  0425   WBC 7.24  --  6.75   Hemoglobin 13.2 L  --  12.0 L   POC Hematocrit  --  40  --    Hematocrit 42.8  --  38.1 L   Platelets 148 L  --  131 L       CHEMISTRIES:  Recent Labs   Lab 11/21/23  0638 02/11/24  1622 02/12/24  0425   Glucose  --  121 H 133 H   Sodium  --  140 138   Potassium  --  4.6 4.1   BUN  --  37 H 29 H   Creatinine  --  1.6 H 1.2   Calcium 9.6 9.5 9.3   Magnesium  --   --  2.1       CARDIAC BIOMARKERS:  Recent Labs   Lab 02/12/24  9206  "  Troponin I 0.028 H       COAGS:  Recent Labs   Lab 02/11/24  1622 02/12/24  0425   INR 1.0 1.1       LIPIDS/LFTS:  Recent Labs   Lab 02/11/24  1622 02/12/24  0425   Cholesterol 145  --    Triglycerides 137  --    HDL 42  --    LDL Cholesterol 75.6  --    Non-HDL Cholesterol 103  --    AST 14 15   ALT 12 11       Hemoglobin A1C   Date Value Ref Range Status   02/11/2024 6.0 (H) 4.0 - 5.6 % Final     Comment:     ADA Screening Guidelines:  5.7-6.4%  Consistent with prediabetes  >or=6.5%  Consistent with diabetes    High levels of fetal hemoglobin interfere with the HbA1C  assay. Heterozygous hemoglobin variants (HbS, HgC, etc)do  not significantly interfere with this assay.   However, presence of multiple variants may affect accuracy.         TSH  Recent Labs   Lab 02/11/24  1622   TSH 2.774       The ASCVD Risk score (Riya DK, et al., 2019) failed to calculate for the following reasons:    The 2019 ASCVD risk score is only valid for ages 40 to 79    The patient has a prior MI or stroke diagnosis       BNP    No results found for: "BNP"         ECHO    Results for orders placed during the hospital encounter of 02/11/24    Echo Saline Bubble? Yes    Interpretation Summary    Left Ventricle: The left ventricle is normal in size. Normal wall thickness. Normal wall motion. There is normal systolic function with a visually estimated ejection fraction of 55 - 60%. Grade I diastolic dysfunction.    Right Ventricle: Normal right ventricular cavity size. Systolic function is normal.    Left Atrium: Left atrium is mildly dilated.    Tricuspid Valve: There is mild regurgitation.    Pulmonic Valve: There is mild regurgitation.    Pulmonary Artery: The estimated pulmonary artery systolic pressure is 34 mmHg.    IVC/SVC: Normal venous pressure at 3 mmHg.    BUBBLE STUDY NEGATIVE FOR RIGHT-TO-LEFT SHUNT      STRESS TEST    No results found for this or any previous visit.        CATH    No results found for this or any previous " visit.

## 2024-02-12 NOTE — DISCHARGE SUMMARY
[facility] Emergency Dept  [facility] Medicine  Discharge Summary      Patient Name: Honorio Myers  MRN: [id number]  MILA: [id number]  Patient Class: IP- Inpatient  Admission Date: 2/11/2024  Hospital Length of Stay: 0 days  Discharge Date and Time:  02/12/2024 5:32 PM  Attending Physician: Rachel Werner MD   Discharging Provider: Jc Schmitz PA-C  Primary Care Provider: Daryl Nolan MD    Primary Care Team: JC SCHMITZ    HPI:   Honorio Myers 88 y.o. male with afib on eliquis, CKD, HTN, presents to the hospital with a chief complaint of aphasia.  Reports sudden onset of aphasia described as the ability to think his words but unable to get them out that began 2 hours prior to arrival.  The symptoms resolved without intervention.  He denies any attempted treatments at home.  There was no numbness weakness in extremity slurred speech difficulty swallowing dizziness.  He reports compliance with his home Eliquis and took it this morning.  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     * No surgery found *      Hospital Course:   Honorio Myers was placed under observation for management of aphasia.    Throughout his observation stay, the patient and his wife have endorsed that his speech has returned to baseline.  They stated that prior to a returned to baseline, he was noted to have word-finding difficulty, which has not returned.  Neurology was consulted and recommended switching to Eliquis 5 mg BID for future stroke prevention, and High Intensity statin  of Atorvastatin 40 mg. SLP evaluated the patient and recommended audiology follow up but no further speech evaluation. Cardiology was consulted for further anticoagulation recommends, per Neurology recommendations. MRI brain was obtained which noted two punctate areas of recent microvascular small vessel ischemic change of the left subinsular cortex and left parietal lobe. Echocardiogram was ordered which noted normal LV size with normal wall motion and normal systolic function with EF of 55-60% and grade 1 DD, with normal RV size and systolic function. 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 Due to further calcification, vascular surgery was consulted. Mr. Myers has decided to leave AMA prior to evaluation by cardiology and vascular surgery. Mr. Myers is of sound decision making capacity and has decided to sign out AMA in the presence of the nursing staff and his wife. Vitals prior to discharge are as follows: HR: 69-83 bpm, RR: 19 and BP: 132/63.    Mr. Myers has signed out AMA prior to Vascular Surgery and Cardiology evaluation.     Goals of Care Treatment Preferences:  Code Status: Full Code      Consults:   Consults (From admission, onward)          Status Ordering Provider     Inpatient consult to Cardiology  Once        Provider:  Jo-Ann Littlejohn MD Acknowledged CHAZHIKAT, CECIL P.     Inpatient consult to Registered Dietitian/Nutritionist  Once        Provider:  (Not yet assigned)    LUIS Pérez     IP consult to case management/social work  Once        Provider:  (Not yet assigned)    Completed LUIS AYALA     Inpatient consult to Neurology  Once        Provider:  Fermin Salazar MD Completed SHOWERS, DANIEL     Inpatient consult to Vascular Surgery  Once        Provider:  Shaka Lazaro MD     Acknowledged LUIS AAYLA     Consult to Telemedicine - Acute Stroke  Once        Provider:  Anibal Luong MD    Acknowledged FABY DUTTON            No new Assessment & Plan notes have been filed under this hospital service since the last note was generated.  Service: Hospital Medicine    Final Active Diagnoses:    Diagnosis Date Noted POA    PRINCIPAL PROBLEM:  Aphasia [R47.01] 02/11/2024 Yes    Paroxysmal A-fib [I48.0] 02/11/2024 Yes    CKD (chronic kidney disease) stage 3, GFR 30-59 ml/min [N18.30] 02/11/2024 Yes    HTN (hypertension) [I10] 02/11/2024 Yes      Problems Resolved During this Admission:       Discharged Condition: against medical advice    Disposition: Left Against Medical Adv*    Follow Up:   Follow-up Information       Daryl Nolan MD Follow up on 2/15/2024.    Specialty: Family Medicine  Why: Thursday @ 2:45 p.m. see Nurse Practitioner, Pati Puente, for your hospital followup appointment.  An appointment was scheduled with the nurse practitioner because Dr. Nolan did not have any appointments available in the desired timeframe.  Contact information:  [place] [id number]  [phone]                           Patient Instructions:      Diet Cardiac   Order Comments: See Stroke Patient Education Guide Booklet for details.     Call 911 for any of the following:   Order Comments: Call 911  right away if any of the following warning signs come on suddenly, even if the symptoms only last for a few minutes. With stroke, timing is very important.   - Warning Signs of Stroke:  - Weakness: You may feel a sudden weakness, tingling or loss of feeling on one side of your face or body.  - Vision Problems: You may have sudden double vision or trouble seeing in one or both eyes.  - Speech Problems: You may have sudden trouble talking, slured speech, or problems understanding others.  - Headache: You may have sudden, severe headache.  - Movement Problems:  You may experience dizziness, a feeling of spinning, a loss of balance, a feeling of falling or blackouts.       Significant Diagnostic Studies: Labs:   CMP   Recent Labs   Lab 02/11/24  1622 02/12/24  0425    138   K 4.6 4.1    107   CO2 22* 23   * 133*   BUN 37* 29*   CREATININE 1.6* 1.2   CALCIUM 9.5 9.3   PROT 7.1 6.2   ALBUMIN 3.8 3.4*   BILITOT 0.8 0.8   ALKPHOS 54* 48*   AST 14 15   ALT 12 11   ANIONGAP 10 8     CBC   Recent Labs   Lab 02/11/24  1622 02/11/24  1633 02/12/24  0425   WBC 7.24  --  6.75   HGB 13.2*  --  12.0*   HCT 42.8   < > 38.1*   *  --  131*    < > = values in this interval not displayed.       Pending Diagnostic Studies:       None           Medications:  Reconciled Home Medications:      Medication List        START taking these medications      aspirin 81 MG Chew  Take 1 tablet (81 mg total) by mouth once daily.     atorvastatin 40 MG tablet  Commonly known as: LIPITOR  Take 1 tablet (40 mg total) by mouth once daily.  Start taking on: February 13, 2024            CHANGE how you take these medications      apixaban 2.5 mg Tab  Commonly known as: ELIQUIS  Take 2 tablets (5 mg total) by mouth 2 (two) times daily.  What changed: how much to take            CONTINUE taking these medications      amLODIPine 5 MG tablet  Commonly known as: NORVASC  Take 2.5 mg by mouth 2 (two) times daily.     CHROMIUM PICOLINATE ORAL  Take 200 mcg by mouth once daily.     finasteride 5 mg tablet  Commonly known as: PROSCAR  Take 5 mg by mouth once daily.     fish oil-omega-3 fatty acids 300-1,000 mg capsule  Take by mouth once daily.     losartan 25 MG tablet  Commonly known as: COZAAR  Take 25 mg by mouth once daily.     metoprolol succinate 50 MG 24 hr tablet  Commonly known as: TOPROL-XL  Take 0.5 tablets by mouth 2 (two) times daily.            STOP taking these medications      rosuvastatin 20 MG tablet  Commonly known as: CRESTOR            Echo Saline Bubble? Yes    Left  Ventricle: The left ventricle is normal in size. Normal wall   thickness. Normal wall motion. There is normal systolic function with a   visually estimated ejection fraction of 55 - 60%. Grade I diastolic   dysfunction.    Right Ventricle: Normal right ventricular cavity size. Systolic   function is normal.    Left Atrium: Left atrium is mildly dilated.    Tricuspid Valve: There is mild regurgitation.    Pulmonic Valve: There is mild regurgitation.    Pulmonary Artery: The estimated pulmonary artery systolic pressure is   34 mmHg.    IVC/SVC: Normal venous pressure at 3 mmHg.  MRI Brain Without Contrast  Narrative: EXAMINATION:  MRI BRAIN WITHOUT CONTRAST    CLINICAL HISTORY:  Stroke, follow up;    FINDINGS:  There is a small diffusion positive focus involving the left subinsular cortex measuring a few mm.  There is a small diffusion positive focus involving the left parietal cortex measuring a few mm.  Remaining brain is diffusion negative.  Pituitary, midline structures, and craniocervical junction demonstrate nothing unusual.  GRE images demonstrate no significant blood products.  Flow voids are present were expected.  There are cataract implants.  There is mild left mastoid air cell opacification.  Skull and the skull base demonstrate nothing unusual.  Hippocampal formations are symmetric.  FLAIR images demonstrate involutional change but no significant chronic microvascular small vessel ischemic change.  Impression: Two punctate areas of recent microvascular small vessel ischemic change involving the left subinsular cortex and the left parietal lobe.    Electronically signed by: Pancho Cruz MD  Date:    02/12/2024  Time:    11:49        Indwelling Lines/Drains at time of discharge:   Lines/Drains/Airways       None                   Time spent on the discharge of patient: 60 minutes         Jc Schmitz PA-C  Department of Hospital Medicine  [facility] - Emergency Dept

## 2024-02-12 NOTE — SUBJECTIVE & OBJECTIVE
Interval History: NAEON; Patient and his wife at bedside endorse that his speech has returned to baseline, since admission, but around 8pm yesterday, he did have some word-finding difficulty which later subsided.    Review of Systems   Constitutional:  Positive for activity change. Negative for chills and fatigue.   HENT:  Negative for congestion, rhinorrhea, sneezing, sore throat and trouble swallowing.    Eyes:  Negative for pain, redness and visual disturbance.   Respiratory:  Negative for apnea, chest tightness, shortness of breath and wheezing.    Cardiovascular:  Negative for chest pain, palpitations and leg swelling.   Gastrointestinal:  Negative for abdominal distention, abdominal pain, constipation, diarrhea and vomiting.   Genitourinary:  Negative for difficulty urinating, dysuria, hematuria and urgency.   Musculoskeletal:  Negative for arthralgias, back pain and gait problem.   Skin:  Negative for color change and pallor.   Neurological:  Positive for speech difficulty. Negative for dizziness, facial asymmetry, weakness, light-headedness and numbness.   Psychiatric/Behavioral:  Negative for agitation and behavioral problems.      Objective:     Vital Signs (Most Recent):  Temp: 98.2 °F (36.8 °C) (02/11/24 2110)  Pulse: 62 (02/12/24 0701)  Resp: 16 (02/12/24 0603)  BP: 119/62 (02/12/24 0701)  SpO2: 96 % (02/12/24 0701) Vital Signs (24h Range):  Temp:  [97.7 °F (36.5 °C)-98.2 °F (36.8 °C)] 98.2 °F (36.8 °C)  Pulse:  [61-87] 62  Resp:  [16-27] 16  SpO2:  [92 %-100 %] 96 %  BP: ()/(53-82) 119/62     Weight: 79.4 kg (175 lb)  Body mass index is 25.84 kg/m².    Intake/Output Summary (Last 24 hours) at 2/12/2024 0929  Last data filed at 2/11/2024 1954  Gross per 24 hour   Intake --   Output 350 ml   Net -350 ml         Physical Exam  Vitals reviewed.   Constitutional:       General: He is not in acute distress.     Appearance: Normal appearance. He is normal weight. He is not ill-appearing.   HENT:       Head: Normocephalic and atraumatic.      Right Ear: External ear normal.      Left Ear: External ear normal.      Nose: Nose normal.      Mouth/Throat:      Pharynx: Oropharynx is clear.   Eyes:      General:         Right eye: No discharge.         Left eye: No discharge.      Extraocular Movements: Extraocular movements intact.      Conjunctiva/sclera: Conjunctivae normal.   Cardiovascular:      Rate and Rhythm: Normal rate and regular rhythm.      Pulses: Normal pulses.      Heart sounds: Normal heart sounds. No murmur heard.     Comments: Admission EKG reviewed and noted to show NSR at 70 bpm.  Pulmonary:      Effort: Pulmonary effort is normal. No respiratory distress.      Breath sounds: Normal breath sounds. No wheezing.   Abdominal:      General: Bowel sounds are normal. There is no distension.      Palpations: Abdomen is soft.      Tenderness: There is no abdominal tenderness.   Musculoskeletal:         General: No swelling or tenderness. Normal range of motion.      Cervical back: Normal range of motion.      Right lower leg: No edema.      Left lower leg: No edema.   Skin:     General: Skin is warm.      Capillary Refill: Capillary refill takes less than 2 seconds.   Neurological:      General: No focal deficit present.      Mental Status: He is alert and oriented to person, place, and time. Mental status is at baseline.      Motor: No weakness, tremor or pronator drift.      Comments: 5/5 strengths noted on upper and lower bilateral extremities             Significant Labs: All pertinent labs within the past 24 hours have been reviewed.  CBC:   Recent Labs   Lab 02/11/24  1622 02/11/24  1633 02/12/24  0425   WBC 7.24  --  6.75   HGB 13.2*  --  12.0*   HCT 42.8 40 38.1*   *  --  131*     CMP:   Recent Labs   Lab 02/11/24  1622 02/12/24  0425    138   K 4.6 4.1    107   CO2 22* 23   * 133*   BUN 37* 29*   CREATININE 1.6* 1.2   CALCIUM 9.5 9.3   PROT 7.1 6.2   ALBUMIN 3.8 3.4*    BILITOT 0.8 0.8   ALKPHOS 54* 48*   AST 14 15   ALT 12 11   ANIONGAP 10 8       Significant Imaging: I have reviewed all pertinent imaging results/findings within the past 24 hours.

## 2024-02-12 NOTE — PT/OT/SLP EVAL
"Speech Language Pathology Evaluation  Cognitive/Bedside Swallow    Patient Name:  Honorio Myers   MRN:  [id number]  Admitting Diagnosis: Aphasia    Recommendations:                  General Recommendations:  Follow-up not indicated, audiology referral   Diet recommendations:  Regular, Thin   Aspiration Precautions: 1 bite/sip at a time   General Precautions: Standard, aspiration  Communication strategies:  [place]    Assessment:     Honorio Myers is a 88 y.o. male with dx of CVA he presents with functional cognitive linguistic skills at reported baseline. It should be noted patient's receptive language is negatively impacted by hearing loss that has not been formally assessed or treated, ST educated Pt regarding importance of maintaining hearing function for preservation of cognitive linguistic skills, pt and wife reported intended compliance with hearing exam post d/c.    History:     Past Medical History:   Diagnosis Date    CKD (chronic kidney disease) stage 3, GFR 30-59 ml/min     Hypertension     Paroxysmal A-fib        Past Surgical History:   Procedure Laterality Date    EYE SURGERY         Social History: Patient lives with wife    Prior diet: unrestricted    Subjective   Pt initially reporting he "thinks" his aphasia has resolved since transient episodes began yesterday. He reports he was aware he was speaking in jargon as these episode where happening. After cognitive linguistic evaluation patient was more confident in reporting he feels aphasia has completely resolved and his cognitive linguistic performance is at baseline.   Patient goals: discover and terat etiology of aphasia episodes.     Pain/Comfort:  Pain Rating 1: 0/10    Respiratory Status: Room air    Objective:     Cognitive Status:    WFL     Receptive Language:   Comprehension:   WFL    Pragmatics:    WFL    Expressive Language:  Verbal:    WFL      Motor Speech:  WFL    Voice:   WFL    Visual-Spatial:  WFL    Reading:   WFL    Oral " Musculature Evaluation  Oral Musculature: WFL  Dentition: present and adequate  Secretion Management: adequate  Mucosal Quality: good  Mandibular Strength and Mobility: WFL  Oral Labial Strength and Mobility: WFL  Lingual Strength and Mobility: [facility]  Buccal Strength and Mobility: [facility]  Voice Prior to PO Intake: [facility]  Oral Musculature Comments: wfl    Bedside Swallow Eval:   Consistencies Assessed:  Thin liquids ~4oz via straw  Solids x2      Oral Phase:   WFL    Pharyngeal Phase:   no overt clinical signs/symptoms of aspiration    Compensatory Strategies  None    Treatment: please note silent aspiration cannot be r/o at bedside.     Goals:   Multidisciplinary Problems       SLP Goals       Not on file              Multidisciplinary Problems (Resolved)          Problem: SLP    Goal Priority Disciplines Outcome   SLP Goal   (Resolved)    Low SLP Met   Description: Pt will participate in SLP eval (goal met 2/12/24)                       Plan:     Plan of Care expires:  02/12/24  Plan of Care reviewed with:  patient   SLP Follow-Up:  No       Discharge recommendations:    no further ST is warranted.   Barriers to Discharge:  None    Time Tracking:     SLP Treatment Date:   02/12/24  Speech Start Time:  1224  Speech Stop Time:  1253     Speech Total Time (min):  29 min    Billable Minutes: Eval 20  and Eval Swallow and Oral Function 9    02/12/2024

## 2024-02-12 NOTE — ASSESSMENT & PLAN NOTE
Temp:  [97.9 °F (36.6 °C)-98.2 °F (36.8 °C)]   Pulse:  [61-87]   Resp:  [16-27]   BP: ()/(53-69)   SpO2:  [92 %-98 %] .   Home meds for hypertension were reviewed and noted below.   Hypertension Medications               amLODIPine (NORVASC) 5 MG tablet Take 2.5 mg by mouth 2 (two) times daily.    losartan (COZAAR) 25 MG tablet Take 25 mg by mouth once daily.    metoprolol succinate (TOPROL-XL) 50 MG 24 hr tablet Take 0.5 tablets by mouth 2 (two) times daily.

## 2024-02-12 NOTE — H&P
[facility] Medicine  History & Physical    Patient Name: Honorio Myers  MRN: [id number]  Patient Class: OP- Observation  Admission Date: 2/11/2024  Attending Physician: Rachel Werner MD   Primary Care Provider: Unable, To Obtain         Patient information was obtained from patient, past medical records, and ER records.     Subjective:     Principal Problem:Aphasia    Chief Complaint:   Chief Complaint   Patient presents with    Aphasia     Pt to ER with reports of expressive aphasia x 2 hours. Pt denies all other symptoms. Dr campoverde in triage. Code stroke called. Pt to CT         HPI: Honorio Myers 88 y.o. male with afib on eliquis, CKD, HTN, presents to the hospital with a chief complaint of aphasia.  Reports sudden onset of aphasia described as the ability to think his words but unable to get them out that began 2 hours prior to arrival.  The symptoms resolved without intervention.  He denies any attempted treatments at home.  There was no numbness weakness in extremity slurred speech difficulty swallowing dizziness.  He reports compliance with his home Eliquis and took it this morning.  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     Past Medical History:   Diagnosis Date    CKD (chronic kidney disease) stage 3, GFR 30-59 ml/min     Hypertension     Paroxysmal A-fib        Past Surgical History:   Procedure Laterality Date    EYE SURGERY         Review of patient's allergies indicates:   Allergen Reactions    Ace inhibitors        No current  facility-administered medications on file prior to encounter.     Current Outpatient Medications on File Prior to Encounter   Medication Sig    amLODIPine (NORVASC) 5 MG tablet Take 2.5 mg by mouth 2 (two) times daily.    apixaban (ELIQUIS) 2.5 mg Tab Take by mouth 2 (two) times daily.    losartan (COZAAR) 25 MG tablet Take 25 mg by mouth once daily.    metoprolol tartrate (LOPRESSOR) 50 MG tablet Take 50 mg by mouth 2 (two) times daily.    rosuvastatin (CRESTOR) 20 MG tablet Take 20 mg by mouth once daily.    CHROMIUM PICOLINATE ORAL Take by mouth.    ergocalciferol (VITAMIN D2) 50,000 unit Cap Take 50,000 Units by mouth every 7 days.    omega-3 fatty acids/fish oil (FISH OIL-OMEGA-3 FATTY ACIDS) 300-1,000 mg capsule Take by mouth once daily.     Family History    None       Tobacco Use    Smoking status: Never    Smokeless tobacco: Never   Substance and Sexual Activity    Alcohol use: Yes    Drug use: Never    Sexual activity: Not on file     Review of Systems   Constitutional:  Negative for chills and fever.   HENT:  Negative for nosebleeds and tinnitus.    Eyes:  Negative for photophobia and visual disturbance.   Respiratory:  Negative for shortness of breath and wheezing.    Cardiovascular:  Negative for chest pain, palpitations and leg swelling.   Gastrointestinal:  Negative for abdominal distention, nausea and vomiting.   Genitourinary:  Negative for dysuria, flank pain and hematuria.   Musculoskeletal:  Negative for gait problem and joint swelling.   Skin:  Negative for rash and wound.   Neurological:  Positive for speech difficulty. Negative for seizures and syncope.     Objective:     Vital Signs (Most Recent):  Temp: 97.7 °F (36.5 °C) (02/11/24 1602)  Pulse: 68 (02/11/24 1700)  Resp: 16 (02/11/24 1700)  BP: (!) 111/56 (02/11/24 1700)  SpO2: 95 % (02/11/24 1700) Vital Signs (24h Range):  Temp:  [97.7 °F (36.5 °C)] 97.7 °F (36.5 °C)  Pulse:  [68-84] 68  Resp:  [16-20] 16  SpO2:  [95 %-100 %] 95 %  BP:  (111-205)/(56-82) 111/56     Weight: 79.4 kg (175 lb)  Body mass index is 25.84 kg/m².     Physical Exam  Vitals and nursing note reviewed.   Constitutional:       General: He is not in acute distress.     Appearance: He is well-developed. He is not diaphoretic.   HENT:      Head: Normocephalic and atraumatic.      Right Ear: External ear normal.      Left Ear: External ear normal.   Eyes:      General:         Right eye: No discharge.         Left eye: No discharge.      Conjunctiva/sclera: Conjunctivae normal.   Neck:      Thyroid: No thyromegaly.   Cardiovascular:      Rate and Rhythm: Normal rate and regular rhythm.      Heart sounds: No murmur heard.  Pulmonary:      Effort: Pulmonary effort is normal. No respiratory distress.      Breath sounds: Normal breath sounds.   Abdominal:      General: Bowel sounds are normal. There is no distension.      Palpations: Abdomen is soft. There is no mass.      Tenderness: There is no abdominal tenderness.   Musculoskeletal:         General: No deformity.      Cervical back: Normal range of motion and neck supple.      Right lower leg: No edema.      Left lower leg: No edema.   Skin:     General: Skin is warm and dry.   Neurological:      Mental Status: He is alert and oriented to person, place, and time.      Sensory: No sensory deficit.      Comments: 5/5 strength BUE and BLE. No facial droop, no slurred speech.    Psychiatric:         Mood and Affect: Mood normal.         Behavior: Behavior normal.                Significant Labs: CBC:   Recent Labs   Lab 02/11/24  1622 02/11/24  1633   WBC 7.24  --    HGB 13.2*  --    HCT 42.8 40   *  --      CMP:   Recent Labs   Lab 02/11/24  1622      K 4.6      CO2 22*   *   BUN 37*   CREATININE 1.6*   CALCIUM 9.5   PROT 7.1   ALBUMIN 3.8   BILITOT 0.8   ALKPHOS 54*   AST 14   ALT 12   ANIONGAP 10     Coagulation:   Recent Labs   Lab 02/11/24  1622   INR 1.0     Lipid Panel:   Recent Labs   Lab  02/11/24  1622   CHOL 145   HDL 42   LDLCALC 75.6   TRIG 137   CHOLHDL 29.0     TSH:   Recent Labs   Lab 02/11/24  1622   TSH 2.774       Significant Imaging:   Imaging Results              CTA Head and Neck (xpd) (Edited Result - FINAL)  Result time 02/11/24 17:04:03      Addendum (preliminary) 1 of 1 by Urban Eugene MD (02/11/24 17:04:03)      No enhancing intracranial lesion is identified.      Electronically signed by: Urban Eugene  Date:    02/11/2024  Time:    17:04                 Final result by Urban Eugene MD (02/11/24 16:57:17)                   Impression:      No significant stenosis at the carotid bifurcations by NASCET criteria.    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  Calcification with moderate approaching severe stenosis at the origin of the left vertebral artery.  No significant stenosis of the right vertebral artery.    No major branch stenosis/occlusion at the aoxxru-da-Xekdmh.    Disc bulging/endplate osteophyte formation with facet ligamentous hypertrophy flattens/impinges the traversing cord at C3-4 through C5-6.      Electronically signed by: Urban Eugene  Date:    02/11/2024  Time:    16:57               Narrative:    EXAMINATION:  CTA HEAD AND NECK (XPD)    CLINICAL HISTORY:  Neuro deficit, acute, stroke suspected;    TECHNIQUE:  CT angiogram was performed from the level of the laura to the top of the head following the IV administration of 100mL of Omnipaque 350.   Sagittal and coronal reconstructions and maximum intensity projection reconstructions were performed. Arterial stenosis percentages are based on NASCET measurement criteria.    COMPARISON:  None    FINDINGS:  There is prominent calcification with severe stenosis at the origin of the left subclavian artery extending to the proximal subclavian artery.  Calcification with moderate approaching severe stenosis is noted at the origin of the left vertebral  artery without significant stenosis on the right.  The right is mildly dominant.    Mild calcification without significant stenosis by NASCET criteria at the carotid bifurcations bilaterally.    Intracranially no major branch stenosis/occlusion at the fxwxvw-cj-Rokuod.  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    Junctional widening of the basilar tip but no evidence of aneurysm.  The venous sinuses are patent.    No soft tissue mass is identified in the neck.  Disc bulging and endplate osteophyte formation with facet and ligamentous hypertrophy at C3-4 through C5-6 flattens/impinges the traversing cord.    These findings were communicated to Dr. Seo at 16:46 on 02/11/2024.                                        CT Head Without Contrast (Final result)  Result time 02/11/24 16:31:17      Final result by John Figueroa MD (02/11/24 16:31:17)                   Impression:      1. No acute intracranial process.  2. 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  3. 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  Limited characterization.  CTA may better characterize.  4. This report was flagged in Epic as abnormal.      Electronically signed by: John Figueroa  Date:    02/11/2024  Time:    16:31               Narrative:    EXAMINATION:  CT HEAD WITHOUT CONTRAST    CLINICAL HISTORY:  Neuro deficit, acute, stroke suspected;    TECHNIQUE:  Low dose axial CT images obtained throughout the head without intravenous contrast. Sagittal and coronal reconstructions were performed.    COMPARISON:  None.    FINDINGS:  Intracranial compartment:    Ventricles and sulci are normal in size for age without evidence of hydrocephalus. No extra-axial blood or fluid collections.    Moderate involutional changes.  Mild probable chronic microvascular ischemic changes in the periventricular white matter.   Small remote lacunar infarct of the right caudate body.    No parenchymal mass, hemorrhage, edema or major vascular distribution infarct.    Skull/extracranial contents (limited evaluation): No fracture. Mastoid air cells and paranasal sinuses are essentially clear.    There is junctional dilation or possible aneurysm at the basilar tip.  Limited characterization.  CTA may better characterize.  No evidence of rupture.                                      Assessment/Plan:     * Aphasia  Presented with expressive aphasia that began 2 hours prior to arrival. Discussed with tele-neurology not a candidate for TPA given eliquis use. CT head without acute intracranial abnormality. CTA head and neck without significant stenosis at carotid bifurcations, no major/branch stenosis/occlusion at [place] of [place]. Stenosis at the proximal left subclavian extending toward left vertebral artery, vascular neurology ok with HM at [facility]  Continue eliquis/statin  MRI/echo  Neurology consulted  Vascular surgery consulted given subclavian stenosis/approaching vertebral artery  PT/OT/SLP  Neuro checks/fall precautions/aspiration precautions/telemetry  Permissive HT    HTN (hypertension)  Home metoprolol/losartan held for permissive HTN pending MRI      Chronic, controlled. Latest blood pressure and vitals reviewed-     Temp:  [97.7 °F (36.5 °C)]   Pulse:  [68-84]   Resp:  [16-20]   BP: (111-205)/(56-82)   SpO2:  [95 %-100 %] .   Home meds for hypertension were reviewed and noted below.   Hypertension Medications               amLODIPine (NORVASC) 5 MG tablet Take 2.5 mg by mouth 2 (two) times daily.    losartan (COZAAR) 25 MG tablet Take 25 mg by mouth once daily.    metoprolol tartrate (LOPRESSOR) 50 MG tablet Take 50 mg by mouth 2 (two) times daily.            While in the hospital, will manage blood pressure as follows; Adjust home antihypertensive regimen as follows- home medications held for permissive HTN    Will utilize p.r.n. blood  pressure medication only if patient's blood pressure greater than 180/110 and he develops symptoms such as worsening chest pain or shortness of breath.    CKD (chronic kidney disease) stage 3, GFR 30-59 ml/min  Cr today of 1.6. baseline of 1.3 to 1.6.       Creatine stable for now. BMP reviewed- noted Estimated Creatinine Clearance: 33.1 mL/min (A) (based on SCr of 1.6 mg/dL (H)). according to latest data. Based on current GFR, CKD stage is stage 3 - GFR 30-59.  Monitor UOP and serial BMP and adjust therapy as needed. Renally dose meds. Avoid nephrotoxic medications and procedures.    Paroxysmal A-fib  Patient with Paroxysmal (<7 days) atrial fibrillation which is controlled currently with Beta Blocker. Anticoagulation indicated. Anticoagulation done with continue home eliquis .      VTE Risk Mitigation (From admission, onward)           Ordered     IP VTE HIGH RISK PATIENT  Once         02/11/24 1827     Place sequential compression device  Until discontinued         02/11/24 1827     Place VAHE hose  Until discontinued         02/11/24 1827                     Discussed with ED physician.    VTE:eliquis  Code: full  Diet: cardiac  Dispo: pending MRI and neuro eval    On 02/11/2024, patient should be placed in hospital observation services under my care in collaboration with Rachel Werner MD.      AdmissionCare    Guideline: Stroke (Ischemic) - OBS, Observation    Based on the indications selected for the patient, the bed status of Observation was determined to be MET    The following indications were selected as present at the time of evaluation of the patient:      - Appropriate evaluation (brain MRI, echocardiogram, carotid imaging) cannot be completed in emergency department time frame (3 to 4 hours).    AdmissionCare documentation entered by: Caitlin Jones    [facility], 27th edition, Copyright © 2023 [facility], [place] All Rights Reserved.  [id number]:00    Jack Gómez PA-C  Department of  [facility] Medicine  [facility] - Emergency Dept

## 2024-02-12 NOTE — ADMISSIONCARE
AdmissionCare    Guideline: Stroke (Ischemic) - OBS, Observation    Based on the indications selected for the patient, the bed status of Observation was determined to be MET    The following indications were selected as present at the time of evaluation of the patient:      - Appropriate evaluation (brain MRI, echocardiogram, carotid imaging) cannot be completed in emergency department time frame (3 to 4 hours).    AdmissionCare documentation entered by: Caitlin Jones    [facility] [place], 27th edition, Copyright © 2023 [facility] [place] [facility] [place], [facility] All Rights Reserved.  [id number]:[id number]:00

## 2024-02-12 NOTE — SUBJECTIVE & OBJECTIVE
Past Medical History:   Diagnosis Date    CKD (chronic kidney disease) stage 3, GFR 30-59 ml/min     Hypertension     Paroxysmal A-fib        Past Surgical History:   Procedure Laterality Date    EYE SURGERY         Review of patient's allergies indicates:   Allergen Reactions    Ace inhibitors        No current facility-administered medications on file prior to encounter.     Current Outpatient Medications on File Prior to Encounter   Medication Sig    amLODIPine (NORVASC) 5 MG tablet Take 2.5 mg by mouth 2 (two) times daily.    apixaban (ELIQUIS) 2.5 mg Tab Take by mouth 2 (two) times daily.    losartan (COZAAR) 25 MG tablet Take 25 mg by mouth once daily.    metoprolol tartrate (LOPRESSOR) 50 MG tablet Take 50 mg by mouth 2 (two) times daily.    rosuvastatin (CRESTOR) 20 MG tablet Take 20 mg by mouth once daily.    CHROMIUM PICOLINATE ORAL Take by mouth.    ergocalciferol (VITAMIN D2) 50,000 unit Cap Take 50,000 Units by mouth every 7 days.    omega-3 fatty acids/fish oil (FISH OIL-OMEGA-3 FATTY ACIDS) 300-1,000 mg capsule Take by mouth once daily.     Family History    None       Tobacco Use    Smoking status: Never    Smokeless tobacco: Never   Substance and Sexual Activity    Alcohol use: Yes    Drug use: Never    Sexual activity: Not on file     Review of Systems   Constitutional:  Negative for chills and fever.   HENT:  Negative for nosebleeds and tinnitus.    Eyes:  Negative for photophobia and visual disturbance.   Respiratory:  Negative for shortness of breath and wheezing.    Cardiovascular:  Negative for chest pain, palpitations and leg swelling.   Gastrointestinal:  Negative for abdominal distention, nausea and vomiting.   Genitourinary:  Negative for dysuria, flank pain and hematuria.   Musculoskeletal:  Negative for gait problem and joint swelling.   Skin:  Negative for rash and wound.   Neurological:  Positive for speech difficulty. Negative for seizures and syncope.     Objective:     Vital Signs  (Most Recent):  Temp: 97.7 °F (36.5 °C) (02/11/24 1602)  Pulse: 68 (02/11/24 1700)  Resp: 16 (02/11/24 1700)  BP: (!) 111/56 (02/11/24 1700)  SpO2: 95 % (02/11/24 1700) Vital Signs (24h Range):  Temp:  [97.7 °F (36.5 °C)] 97.7 °F (36.5 °C)  Pulse:  [68-84] 68  Resp:  [16-20] 16  SpO2:  [95 %-100 %] 95 %  BP: (111-205)/(56-82) 111/56     Weight: 79.4 kg (175 lb)  Body mass index is 25.84 kg/m².     Physical Exam  Vitals and nursing note reviewed.   Constitutional:       General: He is not in acute distress.     Appearance: He is well-developed. He is not diaphoretic.   HENT:      Head: Normocephalic and atraumatic.      Right Ear: External ear normal.      Left Ear: External ear normal.   Eyes:      General:         Right eye: No discharge.         Left eye: No discharge.      Conjunctiva/sclera: Conjunctivae normal.   Neck:      Thyroid: No thyromegaly.   Cardiovascular:      Rate and Rhythm: Normal rate and regular rhythm.      Heart sounds: No murmur heard.  Pulmonary:      Effort: Pulmonary effort is normal. No respiratory distress.      Breath sounds: Normal breath sounds.   Abdominal:      General: Bowel sounds are normal. There is no distension.      Palpations: Abdomen is soft. There is no mass.      Tenderness: There is no abdominal tenderness.   Musculoskeletal:         General: No deformity.      Cervical back: Normal range of motion and neck supple.      Right lower leg: No edema.      Left lower leg: No edema.   Skin:     General: Skin is warm and dry.   Neurological:      Mental Status: He is alert and oriented to person, place, and time.      Sensory: No sensory deficit.      Comments: 5/5 strength BUE and BLE. No facial droop, no slurred speech.    Psychiatric:         Mood and Affect: Mood normal.         Behavior: Behavior normal.                Significant Labs: CBC:   Recent Labs   Lab 02/11/24  1622 02/11/24  1633   WBC 7.24  --    HGB 13.2*  --    HCT 42.8 40   *  --      CMP:   Recent  Labs   Lab 02/11/24  1622      K 4.6      CO2 22*   *   BUN 37*   CREATININE 1.6*   CALCIUM 9.5   PROT 7.1   ALBUMIN 3.8   BILITOT 0.8   ALKPHOS 54*   AST 14   ALT 12   ANIONGAP 10     Coagulation:   Recent Labs   Lab 02/11/24  1622   INR 1.0     Lipid Panel:   Recent Labs   Lab 02/11/24  1622   CHOL 145   HDL 42   LDLCALC 75.6   TRIG 137   CHOLHDL 29.0     TSH:   Recent Labs   Lab 02/11/24  1622   TSH 2.774       Significant Imaging:   Imaging Results              CTA Head and Neck (xpd) (Edited Result - FINAL)  Result time 02/11/24 17:04:03      Addendum (preliminary) 1 of 1 by Urban Eugene MD (02/11/24 17:04:03)      No enhancing intracranial lesion is identified.      Electronically signed by: Urban Eugene  Date:    02/11/2024  Time:    17:04                 Final result by Urban Eugene MD (02/11/24 16:57:17)                   Impression:      No significant stenosis at the carotid bifurcations by NASCET criteria.    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  Calcification with moderate approaching severe stenosis at the origin of the left vertebral artery.  No significant stenosis of the right vertebral artery.    No major branch stenosis/occlusion at the vaokuv-lq-Izdrxs.    Disc bulging/endplate osteophyte formation with facet ligamentous hypertrophy flattens/impinges the traversing cord at C3-4 through C5-6.      Electronically signed by: Urban Eugene  Date:    02/11/2024  Time:    16:57               Narrative:    EXAMINATION:  CTA HEAD AND NECK (XPD)    CLINICAL HISTORY:  Neuro deficit, acute, stroke suspected;    TECHNIQUE:  CT angiogram was performed from the level of the laura to the top of the head following the IV administration of 100mL of Omnipaque 350.   Sagittal and coronal reconstructions and maximum intensity projection reconstructions were performed. Arterial stenosis percentages are based on NASCET  measurement criteria.    COMPARISON:  None    FINDINGS:  There is prominent calcification with severe stenosis at the origin of the left subclavian artery extending to the proximal subclavian artery.  Calcification with moderate approaching severe stenosis is noted at the origin of the left vertebral artery without significant stenosis on the right.  The right is mildly dominant.    Mild calcification without significant stenosis by NASCET criteria at the carotid bifurcations bilaterally.    Intracranially no major branch stenosis/occlusion at the dmcxhg-eu-Gajzod.  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    Junctional widening of the basilar tip but no evidence of aneurysm.  The venous sinuses are patent.    No soft tissue mass is identified in the neck.  Disc bulging and endplate osteophyte formation with facet and ligamentous hypertrophy at C3-4 through C5-6 flattens/impinges the traversing cord.    These findings were communicated to Dr. Seo at 16:46 on 02/11/2024.                                        CT Head Without Contrast (Final result)  Result time 02/11/24 16:31:17      Final result by John Figueroa MD (02/11/24 16:31:17)                   Impression:      1. No acute intracranial process.  2. 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  3. 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  Limited characterization.  CTA may better characterize.  4. This report was flagged in Epic as abnormal.      Electronically signed by: John Figueroa  Date:    02/11/2024  Time:    16:31               Narrative:    EXAMINATION:  CT HEAD WITHOUT CONTRAST    CLINICAL HISTORY:  Neuro deficit, acute, stroke suspected;    TECHNIQUE:  Low dose axial CT images obtained throughout the head without intravenous contrast. Sagittal and coronal reconstructions were  performed.    COMPARISON:  None.    FINDINGS:  Intracranial compartment:    Ventricles and sulci are normal in size for age without evidence of hydrocephalus. No extra-axial blood or fluid collections.    Moderate involutional changes.  Mild probable chronic microvascular ischemic changes in the periventricular white matter.  Small remote lacunar infarct of the right caudate body.    No parenchymal mass, hemorrhage, edema or major vascular distribution infarct.    Skull/extracranial contents (limited evaluation): No fracture. Mastoid air cells and paranasal sinuses are essentially clear.    There is junctional dilation or possible aneurysm at the basilar tip.  Limited characterization.  CTA may better characterize.  No evidence of rupture.

## 2024-02-12 NOTE — CONSULTS
[facility]  Neurology Consult Note    Patient Name: Honorio Myers  Age: 88 y.o.  MRN: [id number]  Admission Date: 2/11/2024  Reason for consult:  Aphasia    CC:  Aphasia    HPI:   Honorio Myers is a 88 y.o. year old afib on eliquis 2.5 mg b.i.d., CKD, HTN presented to the ED yesterday for evaluation of sudden onset of aphasia.  History obtained from patient, patient's wife at the bedside and chart review.    Patient states that at around 2:00 p.m., he started experiencing sudden onset of speech abnormality.  He states that he has not have any issues with the understanding his wife speech.  But when he talked, his speech was garbled and nonsensical.  Patient and wife unclear for how long the symptoms lasted.  She states that as soon as patient's symptoms started, they drove him to the ER at which time he slowly started improving.  They estimate the symptoms to be lasting around 10 minutes.  Patient states that he again had some word-finding difficulty for a few minutes at around 8:00 p.m. which cleared prior to going to bed.  Patient denies any visual symptoms are weakness and numbness in arms or legs.  Patient denies any prior episodes of focal neurological symptoms.  He is compliant with his Eliquis and last dose was yesterday morning.    In the ED, /82, rest of the vitals were unremarkable.  CT head was unremarkable.  CTA head and neck showed no LVO/HGS.  Patient's symptoms were back to baseline and he was not a candidate for acute therapy.  Patient was admitted for further workup.  MRI brain obtained this morning revealed punctate infarct in the left subinsular cortex and parietal lobe.    At baseline, patient is independent of daily activities.  Does not take aspirin.      Histories:  Allergies:  Ace inhibitors    Current Medications:    Current Facility-Administered Medications   Medication Dose Route Frequency Provider Last Rate Last Admin    acetaminophen tablet 650 mg  650 mg Oral  "Q6H PRN ShowJack bustamante PA-C        apixaban tablet 2.5 mg  2.5 mg Oral BID Jack Gómez PA-C   2.5 mg at 02/12/24 0944    atorvastatin tablet 40 mg  40 mg Oral Daily Jack Gómez PA-C   40 mg at 02/12/24 0944    bisacodyL suppository 10 mg  10 mg Rectal Daily PRN ShowJack bustamante PA-C        melatonin tablet 6 mg  6 mg Oral Nightly PRN ShowJack bustamante PA-C   6 mg at 02/12/24 0132    senna-docusate 8.6-50 mg per tablet 1 tablet  1 tablet Oral Daily PRN ShowJack bustamante PA-C        sodium chloride 0.9% flush 10 mL  10 mL Intravenous PRN ShowJack bustamante PA-C         Current Outpatient Medications   Medication Sig Dispense Refill    amLODIPine (NORVASC) 5 MG tablet Take 2.5 mg by mouth 2 (two) times daily.      apixaban (ELIQUIS) 2.5 mg Tab Take by mouth 2 (two) times daily.      finasteride (PROSCAR) 5 mg tablet Take 5 mg by mouth once daily.      losartan (COZAAR) 25 MG tablet Take 25 mg by mouth once daily.      metoprolol succinate (TOPROL-XL) 50 MG 24 hr tablet Take 0.5 tablets by mouth 2 (two) times daily.      omega-3 fatty acids/fish oil (FISH OIL-OMEGA-3 FATTY ACIDS) 300-1,000 mg capsule Take by mouth once daily.      rosuvastatin (CRESTOR) 20 MG tablet Take 20 mg by mouth once daily.      CHROMIUM PICOLINATE ORAL Take 200 mcg by mouth once daily.         Medical:   Past Medical History:   Diagnosis Date    CKD (chronic kidney disease) stage 3, GFR 30-59 ml/min     Hypertension     Paroxysmal A-fib         Surgeries:   Past Surgical History:   Procedure Laterality Date    EYE SURGERY          Family: History reviewed. No pertinent family history.,     Tobacco Use    Smoking status: Never    Smokeless tobacco: Never   Substance and Sexual Activity    Alcohol use: Yes    Drug use: Never    Sexual activity: Not on file         Physical Exam:     Physical Examination  /62   Pulse 73   Temp 98.2 °F (36.8 °C) (Oral)   Resp 19   Ht 5' 9" (1.753 m)   Wt 79.4 kg (175 lb)   SpO2 96%   BMI 25.84 " kg/m²   Body mass index is 25.84 kg/m².    Neurological Exam  NIHSS ([employer] [facility] Stroke Scale)   1a  Level of consciousness: 0=alert; keenly responsive   1b. LOC questions:  0 = Answers both questions correctly   1c. LOC commands: 0=Answers both tasks correctly   2.  Best Gaze: 0=normal   3. Visual: 0=No visual loss   4. Facial Palsy: 0=Normal symmetric movement   5a. Motor left arm: 0=No drift, limb holds 90 (or 45) degrees for full 10 seconds   5b.  Motor right arm: 0=No drift, limb holds 90 (or 45) degrees for full 10 seconds   6a. motor left le=No drift, limb holds 90 (or 45) degrees for full 10 seconds   6b  Motor right le=No drift, limb holds 90 (or 45) degrees for full 10 seconds   7. Limb Ataxia: 0=Absent   8.  Sensory: 0=Normal; no sensory loss   9. Best Language:  0=No aphasia, normal   10. Dysarthria: 0=Normal   11. Extinction and Inattention: 0=No abnormality         Total:   0         Significant Labs:   LDL 75.6  Hemoglobin A1c 6    Significant Imaging:   All images were personally interpreted:   CTA head and neck  No significant stenosis at the carotid bifurcations by NASCET criteria.  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  Calcification with moderate approaching severe stenosis at the origin of the left vertebral artery.  No significant stenosis of the right vertebral artery.  No major branch stenosis/occlusion at the [id number].     MRI brain without contrast  Two punctate areas of recent microvascular small vessel ischemic change involving the left subinsular cortex and the left parietal lobe     Echo read pending    Assessment and Plan:    88 y.o. year old afib on eliquis 2.5 mg b.i.d., CKD, HTN presented to the ED yesterday for evaluation of sudden onset of aphasia.  Patient had symptoms of expressive aphasia for 10 minutes which resolved upon arrival to ER.  NIHSS 0 upon arrival.  Not a candidate for acute  therapy.  Etiology unclear but appearance of the stroke on the imaging concerning for embolic etiology.  Patient is on Eliquis 2.5 mg b.i.d. but does not satisfy the half dosing criteria, creatinine 1.2(cut off >1.5), weight is 79 kgs(cut off <60 kgs).     Plan:   - start on Aspirin 81 mg daily  - patient is on Eliquis 2.5 mg b.i.d. however we recommend 5 mg b.i.d. for future stroke prevention  - High intensity statin atorvastatin 40 mg daily  -Transthoracic ECHO (TTE) read pending  -Permissive HTN x 24 hrs post index event / long term < 140/90   -Goal Parameters for TIA/stroke: LDL<70 mg/dl, HbA1C: <7.0 %,  SBP<130/80 (discussed risk factor optimization in order to reduce the risk of future events with help of PCP)  -PT/OT evaluation and therapy goals per their recommendations  -Diet and Exercise: Discussed aggressive lifestyle modification. Eat primarily plant-based foods, such as fruits and vegetables, whole grains, legumes (beans) and nuts. Discussed Mediterranean diet. Daily exercise (150 minutes per week).  -Provided education regarding future stroke identification: I went over the usual stroke warning signs and symptoms, and the need to activate EMS (call 911) as soon as the symptoms present including-sudden onset numbness or weakness of the face, arm, or leg; especially on one side of the body; sudden confusion, trouble speaking, or understanding; sudden trouble seeing in one or both eyes; sudden trouble walking; dizziness; loss of coordination.   - follow up in Neurology stroke Clinic after discharge.        Thank you for your consult. I will sign off. Please contact us if you have any additional questions.    Time spent on this encounter: 90 minutes. This includes face to face time and non-face to face time preparing to see the patient (eg, review of tests), obtaining and/or reviewing separately obtained history, documenting clinical information in the electronic or other health record, independently  interpreting results and communicating results to the patient/family/caregiver, or care coordinator.     Zuri Cantu MD  Neurology  [facility]

## 2024-02-12 NOTE — HPI
PATIENT IS A PLEASANT 88-YEAR-OLD MAN.  VERY FUNCTIONAL.  FOLLOWS WITH DR. JESSICA GUADARRAMA AT [facility].  AFIB ON ELIQUIS 2.5 MG B.I.D..  CAME IN WITH APHASIA.  NEUROLOGY SAW HIM AND RECOMMENDED STARTING ASPIRIN 81 MG DAILY AND ELIQUIS DOSE TO BE INCREASED TO 5 MG B.I.D..  HAD ECHOCARDIOGRAM DONE TODAY WHICH SHOWED NORMAL LEFT VENTRICLE SYSTOLIC FUNCTION.  DENIES ORTHOPNEA, PND, SWELLING OF FEET.  NEUROLOGICAL DYSFUNCTION HAS RESOLVED.    Results for orders placed during the hospital encounter of 02/11/24    Echo Saline Bubble? Yes    Interpretation Summary    Left Ventricle: The left ventricle is normal in size. Normal wall thickness. Normal wall motion. There is normal systolic function with a visually estimated ejection fraction of 55 - 60%. Grade I diastolic dysfunction.    Right Ventricle: Normal right ventricular cavity size. Systolic function is normal.    Left Atrium: Left atrium is mildly dilated.    Tricuspid Valve: There is mild regurgitation.    Pulmonic Valve: There is mild regurgitation.    Pulmonary Artery: The estimated pulmonary artery systolic pressure is 34 mmHg.    IVC/SVC: Normal venous pressure at 3 mmHg.          HPI: Honorio Myers 88 y.o. male with afib on eliquis, CKD, HTN, presents to the hospital with a chief complaint of aphasia.  Reports sudden onset of aphasia described as the ability to think his words but unable to get them out that began 2 hours prior to arrival.  The symptoms resolved without intervention.  He denies any attempted treatments at home.  There was no numbness weakness in extremity slurred speech difficulty swallowing dizziness.  He reports compliance with his home Eliquis and took it this morning.  He denies fever chest pain nausea vomiting abdominal pain leg swelling melena hematuria hematemesis dizziness and syncope     In the ED, discussed with vascular neurology not a TNK candidate given eliquis use, Cr 1.6, CT head without acute abnormality, CTA  head and neck without significant stenosis at the carotid bifurcations, extensive calcifications with severe stenosis at the proximal left subclaviana and moderate approaching severe stenosis at the left vertebral artery.

## 2024-02-12 NOTE — HPI
Honorio KC Louis 88 y.o. male with afib on eliquis, CKD, HTN, presents to the hospital with a chief complaint of aphasia.  Reports sudden onset of aphasia described as the ability to think his words but unable to get them out that began 2 hours prior to arrival.  The symptoms resolved without intervention.  He denies any attempted treatments at home.  There was no numbness weakness in extremity slurred speech difficulty swallowing dizziness.  He reports compliance with his home Eliquis and took it this morning.  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

## 2024-02-12 NOTE — ASSESSMENT & PLAN NOTE
CURRENTLY IN SINUS RHYTHM.  AGREE WITH NEUROLOGY RECOMMENDATIONS OF ADDING ASPIRIN 81 MG DAILY AND INCREASING ELIQUIS TO 5 MG B.I.D..  MAKE THOSE RECOMMENDATIONS TO THE PATIENT.  PATIENT STATES THAT HE WOULD LIKE TO DISCUSS THIS WITH HIS PRIMARY CARDIOLOGIST DR. HOPKINS BEFORE MAKING ANY CHANGES.  CURRENTLY IN SINUS RHYTHM.  BUBBLE STUDY NEGATIVE ON ECHOCARDIOGRAM     FOLLOW-UP IN CARDIOLOGY CLINIC

## 2024-02-12 NOTE — ASSESSMENT & PLAN NOTE
Home metoprolol/losartan held for permissive HTN pending MRI      Chronic, controlled. Latest blood pressure and vitals reviewed-     Temp:  [97.7 °F (36.5 °C)]   Pulse:  [68-84]   Resp:  [16-20]   BP: (111-205)/(56-82)   SpO2:  [95 %-100 %] .   Home meds for hypertension were reviewed and noted below.   Hypertension Medications               amLODIPine (NORVASC) 5 MG tablet Take 2.5 mg by mouth 2 (two) times daily.    losartan (COZAAR) 25 MG tablet Take 25 mg by mouth once daily.    metoprolol tartrate (LOPRESSOR) 50 MG tablet Take 50 mg by mouth 2 (two) times daily.            While in the hospital, will manage blood pressure as follows; Adjust home antihypertensive regimen as follows- home medications held for permissive HTN    Will utilize p.r.n. blood pressure medication only if patient's blood pressure greater than 180/110 and he develops symptoms such as worsening chest pain or shortness of breath.

## 2024-02-12 NOTE — CONSULTS
[facility] - Emergency Dept  Initial Discharge Assessment       Primary Care Provider: Daryl Nolan MD  Case Management Assessment     PCP: See above  Pharmacy: [facility]    Patient Arrived From: Home with spouse  Existing Help at Home: spouse    Barriers to Discharge: none    Discharge Plan:    A. Home   B. Home Health        Admission Diagnosis: Acute focal neurological deficit [R29.818]    Admission Date: 2/11/2024  Expected Discharge Date:     Transition of Care Barriers: None    Payor: MEDICARE / Plan: MEDICARE PART A & B / Product Type: Government /     Extended Emergency Contact Information  Primary Emergency Contact: allie king  Mobile Phone: [phone]  Relation: Spouse  Preferred language: English   needed? No    Discharge Plan A: Home  Discharge Plan B: Home Health      [facility] [address]  [place] [address]  [address]  [place] [address]  Phone: [phone] Fax: [phone]      Initial Assessment (most recent)       Adult Discharge Assessment - 02/12/24 1401          Discharge Assessment    Assessment Type Discharge Planning Assessment     Confirmed/corrected address, phone number and insurance Yes     Confirmed Demographics Correct on Facesheet     Source of Information patient;family     When was your last doctors appointment? --   1 month ago    Communicated REJI with patient/caregiver Yes   Possibly today if all test are done (cards, neurology, vascular)    Reason For Admission Stroke     People in Home spouse     Facility Arrived From: Home     Do you expect to return to your current living situation? Yes     Do you have help at home or someone to help you manage your care at home? Yes     Who are your caregiver(s) and their phone number(s)? wife:  Allie Louis:  [phone]     Prior to hospitilization cognitive status: Alert/Oriented     Current cognitive status: Alert/Oriented     Walking or Climbing Stairs  Difficulty no     Dressing/Bathing Difficulty no     Equipment Currently Used at Home none     Readmission within 30 days? No     Patient currently being followed by outpatient case management? No     Do you currently have service(s) that help you manage your care at home? No     Do you take prescription medications? Yes     Do you have prescription coverage? Yes     Coverage BCBS Medicare     Do you have any problems affording any of your prescribed medications? No     Who is going to help you get home at discharge? wife     How do you get to doctors appointments? car, drives self     Are you on dialysis? No     Do you take coumadin? No   Eliquis    Discharge Plan A Home     Discharge Plan B Home Health     DME Needed Upon Discharge  none     Discharge Plan discussed with: Spouse/sig other;Patient     Transition of Care Barriers None

## 2024-02-12 NOTE — ASSESSMENT & PLAN NOTE
Patient with Paroxysmal (<7 days) atrial fibrillation which is controlled currently with Beta Blocker. Anticoagulation indicated. Anticoagulation done with continue home eliquis .

## 2024-02-12 NOTE — ASSESSMENT & PLAN NOTE
Cr today of 1.6. baseline of 1.3 to 1.6.       Creatine stable for now. BMP reviewed- noted Estimated Creatinine Clearance: 33.1 mL/min (A) (based on SCr of 1.6 mg/dL (H)). according to latest data. Based on current GFR, CKD stage is stage 3 - GFR 30-59.  Monitor UOP and serial BMP and adjust therapy as needed. Renally dose meds. Avoid nephrotoxic medications and procedures.

## 2024-02-14 LAB
OHS QRS DURATION: 88 MS
OHS QTC CALCULATION: 401 MS